# Patient Record
Sex: MALE | Race: WHITE | NOT HISPANIC OR LATINO | Employment: OTHER | URBAN - METROPOLITAN AREA
[De-identification: names, ages, dates, MRNs, and addresses within clinical notes are randomized per-mention and may not be internally consistent; named-entity substitution may affect disease eponyms.]

---

## 2023-06-22 LAB
ALBUMIN (MG/L) IN URINE: 7.9 MG/L
ALBUMIN/CREATININE (UG/MG) IN URINE: 5.2 UG/MG CRT (ref 0–30)
ANION GAP IN SER/PLAS: 10 MMOL/L (ref 10–20)
APPEARANCE, URINE: CLEAR
BILIRUBIN, URINE: NEGATIVE
BLOOD, URINE: ABNORMAL
CALCIUM (MG/DL) IN SER/PLAS: 9.2 MG/DL (ref 8.6–10.3)
CARBON DIOXIDE, TOTAL (MMOL/L) IN SER/PLAS: 29 MMOL/L (ref 21–32)
CHLORIDE (MMOL/L) IN SER/PLAS: 104 MMOL/L (ref 98–107)
CHOLESTEROL (MG/DL) IN SER/PLAS: 125 MG/DL (ref 0–199)
CHOLESTEROL IN HDL (MG/DL) IN SER/PLAS: 48.9 MG/DL
CHOLESTEROL/HDL RATIO: 2.6
COLOR, URINE: YELLOW
CREATININE (MG/DL) IN SER/PLAS: 1.11 MG/DL (ref 0.5–1.3)
CREATININE (MG/DL) IN URINE: 151 MG/DL (ref 20–370)
GFR MALE: 70 ML/MIN/1.73M2
GLUCOSE (MG/DL) IN SER/PLAS: 85 MG/DL (ref 74–99)
GLUCOSE, URINE: NEGATIVE MG/DL
HYALINE CASTS, URINE: ABNORMAL /LPF
KETONES, URINE: NEGATIVE MG/DL
LDL: 63 MG/DL (ref 0–99)
LEUKOCYTE ESTERASE, URINE: NEGATIVE
MUCUS, URINE: ABNORMAL /LPF
NITRITE, URINE: NEGATIVE
PH, URINE: 6 (ref 5–8)
POTASSIUM (MMOL/L) IN SER/PLAS: 4.6 MMOL/L (ref 3.5–5.3)
PROSTATE SPECIFIC AG (NG/ML) IN SER/PLAS: 0.39 NG/ML (ref 0–4)
PROTEIN, URINE: NEGATIVE MG/DL
RBC, URINE: 1 /HPF (ref 0–5)
SODIUM (MMOL/L) IN SER/PLAS: 138 MMOL/L (ref 136–145)
SPECIFIC GRAVITY, URINE: 1.02 (ref 1–1.03)
TRIGLYCERIDE (MG/DL) IN SER/PLAS: 66 MG/DL (ref 0–149)
UREA NITROGEN (MG/DL) IN SER/PLAS: 15 MG/DL (ref 6–23)
UROBILINOGEN, URINE: <2 MG/DL (ref 0–1.9)
VLDL: 13 MG/DL (ref 0–40)
WBC, URINE: <1 /HPF (ref 0–5)

## 2023-06-27 ENCOUNTER — OFFICE VISIT (OUTPATIENT)
Dept: PRIMARY CARE | Facility: CLINIC | Age: 73
End: 2023-06-27
Payer: MEDICARE

## 2023-06-27 VITALS
HEIGHT: 73 IN | WEIGHT: 197 LBS | OXYGEN SATURATION: 95 % | BODY MASS INDEX: 26.11 KG/M2 | DIASTOLIC BLOOD PRESSURE: 60 MMHG | RESPIRATION RATE: 18 BRPM | SYSTOLIC BLOOD PRESSURE: 120 MMHG | HEART RATE: 68 BPM

## 2023-06-27 DIAGNOSIS — I25.84 CORONARY ARTERY CALCIFICATION: ICD-10-CM

## 2023-06-27 DIAGNOSIS — I25.10 CORONARY ARTERY CALCIFICATION: ICD-10-CM

## 2023-06-27 DIAGNOSIS — E78.2 MIXED HYPERLIPIDEMIA: ICD-10-CM

## 2023-06-27 DIAGNOSIS — E55.9 VITAMIN D DEFICIENCY DISEASE: ICD-10-CM

## 2023-06-27 DIAGNOSIS — I48.0 PAROXYSMAL A-FIB (MULTI): ICD-10-CM

## 2023-06-27 DIAGNOSIS — N40.0 ENLARGED PROSTATE WITHOUT LOWER URINARY TRACT SYMPTOMS (LUTS): ICD-10-CM

## 2023-06-27 DIAGNOSIS — Z00.00 ANNUAL PHYSICAL EXAM: ICD-10-CM

## 2023-06-27 DIAGNOSIS — G62.9 NEUROPATHY: Primary | ICD-10-CM

## 2023-06-27 PROBLEM — E78.5 HYPERLIPEMIA: Status: ACTIVE | Noted: 2023-06-27

## 2023-06-27 PROBLEM — K40.90 LEFT INGUINAL HERNIA: Status: ACTIVE | Noted: 2023-06-27

## 2023-06-27 PROBLEM — L60.2 THICKENED NAIL: Status: RESOLVED | Noted: 2023-06-27 | Resolved: 2023-06-27

## 2023-06-27 PROBLEM — R20.0 NUMBNESS: Status: ACTIVE | Noted: 2023-06-27

## 2023-06-27 PROBLEM — L60.2 THICKENED NAIL: Status: ACTIVE | Noted: 2023-06-27

## 2023-06-27 PROBLEM — Q21.12 PFO (PATENT FORAMEN OVALE) (HHS-HCC): Status: ACTIVE | Noted: 2023-06-27

## 2023-06-27 PROBLEM — I77.82: Status: ACTIVE | Noted: 2023-06-27

## 2023-06-27 PROBLEM — K63.5 COLON POLYPS: Status: ACTIVE | Noted: 2023-06-27

## 2023-06-27 PROBLEM — R93.1 AGATSTON CORONARY ARTERY CALCIUM SCORE GREATER THAN 400: Status: ACTIVE | Noted: 2023-06-27

## 2023-06-27 PROBLEM — E88.810 SYNDROME X, METABOLIC: Status: ACTIVE | Noted: 2023-06-27

## 2023-06-27 PROBLEM — I47.20 VENTRICULAR TACHYCARDIA (MULTI): Status: ACTIVE | Noted: 2023-06-27

## 2023-06-27 PROCEDURE — 1036F TOBACCO NON-USER: CPT | Performed by: INTERNAL MEDICINE

## 2023-06-27 PROCEDURE — 99204 OFFICE O/P NEW MOD 45 MIN: CPT | Performed by: INTERNAL MEDICINE

## 2023-06-27 PROCEDURE — 1159F MED LIST DOCD IN RCRD: CPT | Performed by: INTERNAL MEDICINE

## 2023-06-27 RX ORDER — GABAPENTIN 100 MG/1
200 CAPSULE ORAL 3 TIMES DAILY
Qty: 180 CAPSULE | Refills: 5 | Status: SHIPPED | OUTPATIENT
Start: 2023-06-27 | End: 2023-12-19 | Stop reason: ALTCHOICE

## 2023-06-27 RX ORDER — ATORVASTATIN CALCIUM 80 MG/1
1 TABLET, FILM COATED ORAL DAILY
COMMUNITY
Start: 2013-06-22 | End: 2024-01-22 | Stop reason: SDUPTHER

## 2023-06-27 RX ORDER — CRANBERRY FRUIT EXTRACT 650 MG
CAPSULE ORAL 2 TIMES DAILY
COMMUNITY

## 2023-06-27 RX ORDER — WARFARIN SODIUM 2.5 MG/1
TABLET ORAL
COMMUNITY

## 2023-06-27 RX ORDER — EZETIMIBE 10 MG/1
1 TABLET ORAL DAILY
COMMUNITY
Start: 2020-03-01 | End: 2024-01-24

## 2023-06-27 RX ORDER — METOPROLOL SUCCINATE 25 MG/1
TABLET, EXTENDED RELEASE ORAL
COMMUNITY
End: 2023-10-04 | Stop reason: SDUPTHER

## 2023-06-27 RX ORDER — ACETAMINOPHEN 500 MG
TABLET ORAL
COMMUNITY
Start: 2016-04-13

## 2023-06-27 RX ORDER — SILDENAFIL 100 MG/1
100 TABLET, FILM COATED ORAL DAILY PRN
COMMUNITY

## 2023-06-27 RX ORDER — WARFARIN SODIUM 5 MG/1
5 TABLET ORAL
COMMUNITY

## 2023-06-27 ASSESSMENT — PATIENT HEALTH QUESTIONNAIRE - PHQ9
SUM OF ALL RESPONSES TO PHQ9 QUESTIONS 1 & 2: 0
2. FEELING DOWN, DEPRESSED OR HOPELESS: NOT AT ALL
1. LITTLE INTEREST OR PLEASURE IN DOING THINGS: NOT AT ALL

## 2023-06-29 ENCOUNTER — APPOINTMENT (OUTPATIENT)
Dept: PRIMARY CARE | Facility: CLINIC | Age: 73
End: 2023-06-29
Payer: MEDICARE

## 2023-07-14 NOTE — PROGRESS NOTES
"Subjective   Osiel Peterson is a 73 y.o. male who presents for New Patient Visit.  Patient presents to [facility].  He was previously seeing Dr. Blanco.  He has a history of atrial fibrillation.  He follows with Dr. Long.  He is anticoagulated with Coumadin.  His last INR was 3.12 weeks ago.  He follows with the Coumadin clinic.  Previous labs reviewed.  LDL at goal at 63.  Patient has a history of BPH.  He follows with Dr. Weiner.  No acute issues or complaints.        Objective     /60 (BP Location: Right arm, Patient Position: Sitting, BP Cuff Size: Adult)   Pulse 68   Resp 18   Ht 1.854 m (6' 1\")   Wt 89.4 kg (197 lb)   SpO2 95%   BMI 25.99 kg/m²      Physical Exam  Constitutional:       Appearance: Normal appearance.   HENT:      Head: Normocephalic and atraumatic.      Nose: Nose normal.      Mouth/Throat:      Mouth: Mucous membranes are moist.      Pharynx: Oropharynx is clear.   Eyes:      Extraocular Movements: Extraocular movements intact.      Pupils: Pupils are equal, round, and reactive to light.   Cardiovascular:      Rate and Rhythm: Normal rate and regular rhythm.   Pulmonary:      Effort: No respiratory distress.      Breath sounds: Normal breath sounds. No wheezing, rhonchi or rales.   Abdominal:      General: Bowel sounds are normal. There is no distension.      Palpations: Abdomen is soft.      Tenderness: There is no abdominal tenderness. There is no guarding.   Musculoskeletal:      Right lower leg: No edema.      Left lower leg: No edema.   Skin:     General: Skin is warm and dry.   Neurological:      Mental Status: He is alert and oriented to person, place, and time. Mental status is at baseline.   Psychiatric:         Mood and Affect: Mood normal.         Behavior: Behavior normal.         Assessment/Plan   Problem List Items Addressed This Visit       Coronary artery calcification    Relevant Medications    metoprolol succinate XL (Toprol-XL) 25 mg 24 hr tablet    " sildenafil (Viagra) 100 mg tablet    Enlarged prostate without lower urinary tract symptoms (luts)    Hyperlipemia    Paroxysmal A-fib (CMS/HCC)    Relevant Medications    metoprolol succinate XL (Toprol-XL) 25 mg 24 hr tablet    sildenafil (Viagra) 100 mg tablet    Other Relevant Orders    CBC    Vitamin D deficiency disease    Relevant Orders    Vitamin D, Total     Other Visit Diagnoses       Neuropathy    -  Primary    Relevant Medications    gabapentin (Neurontin) 100 mg capsule    Annual physical exam        Relevant Orders    CBC    Comprehensive Metabolic Panel    Lipid Panel    Vitamin D, Total    Thyroid Stimulating Hormone    Hemoglobin A1C    Vitamin B12          Continue medications as previously prescribed  Start gabapentin 300 mg 3 times daily for neuropathy  Maintain follow-ups with specialist.  Return in 6 months for a physical.  We will check a B12 level as well.       Tripp Uriarte, DO

## 2023-10-04 DIAGNOSIS — I48.0 PAROXYSMAL A-FIB (MULTI): ICD-10-CM

## 2023-10-04 DIAGNOSIS — I47.20 VENTRICULAR TACHYCARDIA (MULTI): Primary | ICD-10-CM

## 2023-10-05 RX ORDER — METOPROLOL SUCCINATE 25 MG/1
TABLET, EXTENDED RELEASE ORAL
Qty: 90 TABLET | Refills: 0 | Status: SHIPPED | OUTPATIENT
Start: 2023-10-05 | End: 2024-01-23 | Stop reason: SDUPTHER

## 2023-10-27 ENCOUNTER — ANTICOAGULATION - WARFARIN VISIT (OUTPATIENT)
Dept: PHARMACY | Facility: CLINIC | Age: 73
End: 2023-10-27
Payer: MEDICARE

## 2023-10-27 DIAGNOSIS — I48.0 PAROXYSMAL ATRIAL FIBRILLATION (MULTI): Primary | ICD-10-CM

## 2023-10-27 PROBLEM — Z86.010 HISTORY OF ADENOMATOUS POLYP OF COLON: Status: ACTIVE | Noted: 2018-06-21

## 2023-10-27 PROBLEM — Z86.0101 HISTORY OF ADENOMATOUS POLYP OF COLON: Status: ACTIVE | Noted: 2018-06-21

## 2023-10-27 LAB
POC INR: 2.4
POC PROTHROMBIN TIME: NORMAL

## 2023-10-27 PROCEDURE — 99212 OFFICE O/P EST SF 10 MIN: CPT

## 2023-10-27 PROCEDURE — 85610 PROTHROMBIN TIME: CPT

## 2023-10-27 RX ORDER — FLECAINIDE ACETATE 50 MG/1
1 TABLET ORAL 2 TIMES DAILY
COMMUNITY
Start: 2023-09-29 | End: 2024-01-22

## 2023-10-27 RX ORDER — DESONIDE 0.5 MG/G
CREAM TOPICAL 2 TIMES DAILY
COMMUNITY
Start: 2013-09-13

## 2023-10-27 NOTE — PROGRESS NOTES
No bleeding or unusual bruising - the 2 bruises from last visit faded.  Medications and doses verified.  No scheduled procedures at this time.  INR=2.4   Plan: Continue same dose.  Follow up INR check in 5 weeks.

## 2023-12-01 ENCOUNTER — APPOINTMENT (OUTPATIENT)
Dept: PHARMACY | Facility: CLINIC | Age: 73
End: 2023-12-01
Payer: MEDICARE

## 2023-12-05 ENCOUNTER — ANTICOAGULATION - WARFARIN VISIT (OUTPATIENT)
Dept: PHARMACY | Facility: CLINIC | Age: 73
End: 2023-12-05
Payer: MEDICARE

## 2023-12-05 DIAGNOSIS — I48.0 PAROXYSMAL ATRIAL FIBRILLATION (MULTI): Primary | ICD-10-CM

## 2023-12-05 LAB
POC INR: 2
POC PROTHROMBIN TIME: NORMAL

## 2023-12-05 PROCEDURE — 85610 PROTHROMBIN TIME: CPT | Mod: QW

## 2023-12-05 PROCEDURE — 99212 OFFICE O/P EST SF 10 MIN: CPT

## 2023-12-05 NOTE — PROGRESS NOTES
Coumadin Clinic Visit Note    Patient verified warfarin dose of 5 mg on Sun and Thur, 7.5 mg all other days in week  No missed doses  No unusual bruising or bleeding  No changes to medications  Patient is taking OMEGA XL otc for the past week, will see doctor next week and discuss if should continue   Consistent dietary green intake  No anticipated procedures at this time  INR Therapeutic today at 2  No changes to warfarin dose today  Did  patient on possible interaction between otc omega supplements and warfarin  Next appointment 5 weeks    Luciana Cantu, Pharm D

## 2023-12-12 ENCOUNTER — LAB (OUTPATIENT)
Dept: LAB | Facility: LAB | Age: 73
End: 2023-12-12
Payer: MEDICARE

## 2023-12-12 DIAGNOSIS — I48.0 PAROXYSMAL A-FIB (MULTI): ICD-10-CM

## 2023-12-12 DIAGNOSIS — Z00.00 ANNUAL PHYSICAL EXAM: ICD-10-CM

## 2023-12-12 DIAGNOSIS — E55.9 VITAMIN D DEFICIENCY DISEASE: ICD-10-CM

## 2023-12-12 LAB
ALBUMIN SERPL BCP-MCNC: 4.1 G/DL (ref 3.4–5)
ALP SERPL-CCNC: 62 U/L (ref 33–136)
ALT SERPL W P-5'-P-CCNC: 31 U/L (ref 10–52)
ANION GAP SERPL CALC-SCNC: 11 MMOL/L (ref 10–20)
AST SERPL W P-5'-P-CCNC: 24 U/L (ref 9–39)
BILIRUB SERPL-MCNC: 1.4 MG/DL (ref 0–1.2)
BUN SERPL-MCNC: 19 MG/DL (ref 6–23)
CALCIUM SERPL-MCNC: 9.2 MG/DL (ref 8.6–10.3)
CHLORIDE SERPL-SCNC: 103 MMOL/L (ref 98–107)
CHOLEST SERPL-MCNC: 135 MG/DL (ref 0–199)
CHOLESTEROL/HDL RATIO: 2.5
CO2 SERPL-SCNC: 29 MMOL/L (ref 21–32)
CREAT SERPL-MCNC: 1.2 MG/DL (ref 0.5–1.3)
ERYTHROCYTE [DISTWIDTH] IN BLOOD BY AUTOMATED COUNT: 15.2 % (ref 11.5–14.5)
GFR SERPL CREATININE-BSD FRML MDRD: 63 ML/MIN/1.73M*2
GLUCOSE SERPL-MCNC: 92 MG/DL (ref 74–99)
HCT VFR BLD AUTO: 48.2 % (ref 41–52)
HDLC SERPL-MCNC: 54.3 MG/DL
HGB BLD-MCNC: 15.5 G/DL (ref 13.5–17.5)
LDLC SERPL CALC-MCNC: 62 MG/DL
MCH RBC QN AUTO: 30.6 PG (ref 26–34)
MCHC RBC AUTO-ENTMCNC: 32.2 G/DL (ref 32–36)
MCV RBC AUTO: 95 FL (ref 80–100)
NON HDL CHOLESTEROL: 81 MG/DL (ref 0–149)
NRBC BLD-RTO: 0 /100 WBCS (ref 0–0)
PLATELET # BLD AUTO: 215 X10*3/UL (ref 150–450)
POTASSIUM SERPL-SCNC: 4.4 MMOL/L (ref 3.5–5.3)
PROT SERPL-MCNC: 6.6 G/DL (ref 6.4–8.2)
RBC # BLD AUTO: 5.07 X10*6/UL (ref 4.5–5.9)
SODIUM SERPL-SCNC: 139 MMOL/L (ref 136–145)
TRIGL SERPL-MCNC: 92 MG/DL (ref 0–149)
TSH SERPL-ACNC: 3.28 MIU/L (ref 0.44–3.98)
VLDL: 18 MG/DL (ref 0–40)
WBC # BLD AUTO: 5.9 X10*3/UL (ref 4.4–11.3)

## 2023-12-12 PROCEDURE — 82607 VITAMIN B-12: CPT

## 2023-12-12 PROCEDURE — 36415 COLL VENOUS BLD VENIPUNCTURE: CPT

## 2023-12-12 PROCEDURE — 80061 LIPID PANEL: CPT

## 2023-12-12 PROCEDURE — 80053 COMPREHEN METABOLIC PANEL: CPT

## 2023-12-12 PROCEDURE — 84443 ASSAY THYROID STIM HORMONE: CPT

## 2023-12-12 PROCEDURE — 82306 VITAMIN D 25 HYDROXY: CPT

## 2023-12-12 PROCEDURE — 83036 HEMOGLOBIN GLYCOSYLATED A1C: CPT

## 2023-12-12 PROCEDURE — 85027 COMPLETE CBC AUTOMATED: CPT

## 2023-12-13 LAB
25(OH)D3 SERPL-MCNC: 54 NG/ML (ref 30–100)
EST. AVERAGE GLUCOSE BLD GHB EST-MCNC: 131 MG/DL
HBA1C MFR BLD: 6.2 %
VIT B12 SERPL-MCNC: 314 PG/ML (ref 211–911)

## 2023-12-19 ENCOUNTER — OFFICE VISIT (OUTPATIENT)
Dept: PRIMARY CARE | Facility: CLINIC | Age: 73
End: 2023-12-19
Payer: MEDICARE

## 2023-12-19 VITALS
HEIGHT: 73 IN | OXYGEN SATURATION: 98 % | DIASTOLIC BLOOD PRESSURE: 64 MMHG | HEART RATE: 74 BPM | RESPIRATION RATE: 16 BRPM | BODY MASS INDEX: 26.24 KG/M2 | WEIGHT: 198 LBS | SYSTOLIC BLOOD PRESSURE: 112 MMHG

## 2023-12-19 DIAGNOSIS — K63.5 POLYP OF COLON, UNSPECIFIED PART OF COLON, UNSPECIFIED TYPE: ICD-10-CM

## 2023-12-19 DIAGNOSIS — E78.2 MIXED HYPERLIPIDEMIA: ICD-10-CM

## 2023-12-19 DIAGNOSIS — R17 TOTAL BILIRUBIN, ELEVATED: Primary | ICD-10-CM

## 2023-12-19 DIAGNOSIS — I25.10 CORONARY ARTERY CALCIFICATION: ICD-10-CM

## 2023-12-19 DIAGNOSIS — I48.0 PAROXYSMAL A-FIB (MULTI): ICD-10-CM

## 2023-12-19 DIAGNOSIS — I25.84 CORONARY ARTERY CALCIFICATION: ICD-10-CM

## 2023-12-19 DIAGNOSIS — N40.0 ENLARGED PROSTATE WITHOUT LOWER URINARY TRACT SYMPTOMS (LUTS): ICD-10-CM

## 2023-12-19 PROCEDURE — 1036F TOBACCO NON-USER: CPT | Performed by: INTERNAL MEDICINE

## 2023-12-19 PROCEDURE — 99214 OFFICE O/P EST MOD 30 MIN: CPT | Performed by: INTERNAL MEDICINE

## 2023-12-19 PROCEDURE — 1126F AMNT PAIN NOTED NONE PRSNT: CPT | Performed by: INTERNAL MEDICINE

## 2023-12-19 PROCEDURE — 1159F MED LIST DOCD IN RCRD: CPT | Performed by: INTERNAL MEDICINE

## 2023-12-19 PROCEDURE — G0439 PPPS, SUBSEQ VISIT: HCPCS | Performed by: INTERNAL MEDICINE

## 2023-12-19 ASSESSMENT — PATIENT HEALTH QUESTIONNAIRE - PHQ9
1. LITTLE INTEREST OR PLEASURE IN DOING THINGS: NOT AT ALL
2. FEELING DOWN, DEPRESSED OR HOPELESS: NOT AT ALL
SUM OF ALL RESPONSES TO PHQ9 QUESTIONS 1 & 2: 0

## 2023-12-19 ASSESSMENT — ENCOUNTER SYMPTOMS
LOSS OF SENSATION IN FEET: 0
DEPRESSION: 0
OCCASIONAL FEELINGS OF UNSTEADINESS: 0

## 2023-12-19 ASSESSMENT — PAIN SCALES - GENERAL: PAINLEVEL: 0-NO PAIN

## 2023-12-27 PROBLEM — I48.0 PAROXYSMAL ATRIAL FIBRILLATION (MULTI): Status: RESOLVED | Noted: 2023-10-27 | Resolved: 2023-12-27

## 2023-12-27 PROBLEM — R20.0 NUMBNESS: Status: RESOLVED | Noted: 2023-06-27 | Resolved: 2023-12-27

## 2023-12-28 NOTE — PROGRESS NOTES
"Patient presents for his yearly physical.  Subjective   Reason for Visit: Osiel Peterson is an 73 y.o. male here for a Medicare Wellness visit.               Recent INR 2.0.  Blood work reviewed with patient.  It is nonfasting.  T. bili elevated at 1.4.  Recently started on flecainide.  Patient is exercising 3-4 times a week.  Patient is due for colonoscopy.  He is a non-smoker.  No issues with bowel or bladder.  No issues with sleep.  He denies depression.  No recent falls.  He feels safe at home.        Patient Care Team:  Tripp Uriarte DO as PCP - General (Internal Medicine)     Review of Systems   All other systems reviewed and are negative.      Objective   Vitals:  /64 (BP Location: Right arm, Patient Position: Sitting, BP Cuff Size: Adult)   Pulse 74   Resp 16   Ht 1.854 m (6' 1\")   Wt 89.8 kg (198 lb)   SpO2 98%   BMI 26.12 kg/m²       Physical Exam  Constitutional:       Appearance: Normal appearance.   HENT:      Head: Normocephalic and atraumatic.      Nose: Nose normal.      Mouth/Throat:      Mouth: Mucous membranes are moist.      Pharynx: Oropharynx is clear.   Eyes:      Extraocular Movements: Extraocular movements intact.      Pupils: Pupils are equal, round, and reactive to light.   Cardiovascular:      Rate and Rhythm: Normal rate and regular rhythm.   Pulmonary:      Effort: No respiratory distress.      Breath sounds: Normal breath sounds. No wheezing, rhonchi or rales.   Abdominal:      General: Bowel sounds are normal. There is no distension.      Palpations: Abdomen is soft.      Tenderness: There is no abdominal tenderness. There is no guarding.   Musculoskeletal:      Right lower leg: No edema.      Left lower leg: No edema.   Skin:     General: Skin is warm and dry.   Neurological:      Mental Status: He is alert and oriented to person, place, and time. Mental status is at baseline.   Psychiatric:         Mood and Affect: Mood normal.         Behavior: Behavior normal. "         Assessment/Plan   Problem List Items Addressed This Visit       Coronary artery calcification    Current Assessment & Plan     Continue current medications         Colon polyps    Current Assessment & Plan     Due for repeat colonoscopy         Enlarged prostate without lower urinary tract symptoms (luts)    Current Assessment & Plan     Continue current medications         Hyperlipemia    Current Assessment & Plan     Continue statin         Paroxysmal A-fib (CMS/HCC)    Current Assessment & Plan     Continue current medications          Other Visit Diagnoses       Total bilirubin, elevated    -  Primary    Relevant Orders    Comprehensive Metabolic Panel          Return in 6 months for repeat CMP to monitor bilirubin

## 2024-01-09 ENCOUNTER — ANTICOAGULATION - WARFARIN VISIT (OUTPATIENT)
Dept: PHARMACY | Facility: CLINIC | Age: 74
End: 2024-01-09
Payer: MEDICARE

## 2024-01-09 ENCOUNTER — CLINICAL SUPPORT (OUTPATIENT)
Dept: PHARMACY | Facility: CLINIC | Age: 74
End: 2024-01-09
Payer: MEDICARE

## 2024-01-09 DIAGNOSIS — I48.0 PAROXYSMAL ATRIAL FIBRILLATION (MULTI): Primary | ICD-10-CM

## 2024-01-09 LAB
POC INR: 3
POC PROTHROMBIN TIME: NORMAL

## 2024-01-09 PROCEDURE — 85610 PROTHROMBIN TIME: CPT | Mod: QW

## 2024-01-09 PROCEDURE — 99212 OFFICE O/P EST SF 10 MIN: CPT

## 2024-01-09 NOTE — PROGRESS NOTES
No bleeding or unusual bruising.  Medications and doses verified.  No scheduled procedures at this time.  INR=3.0   Plan: Continue same weekly dose.  Follow up INR check in 5 weeks.

## 2024-01-18 DIAGNOSIS — E78.2 MIXED HYPERLIPIDEMIA: ICD-10-CM

## 2024-01-22 DIAGNOSIS — I48.0 PAROXYSMAL A-FIB (MULTI): ICD-10-CM

## 2024-01-22 PROBLEM — R00.2 PALPITATIONS: Status: ACTIVE | Noted: 2024-01-22

## 2024-01-22 PROBLEM — K59.00 CONSTIPATION: Status: ACTIVE | Noted: 2024-01-22

## 2024-01-22 PROBLEM — D72.829 LEUKOCYTOSIS: Status: ACTIVE | Noted: 2024-01-22

## 2024-01-22 PROBLEM — T50.905A PILL ESOPHAGITIS: Status: ACTIVE | Noted: 2024-01-22

## 2024-01-22 PROBLEM — R10.9 RIGHT SIDED ABDOMINAL PAIN: Status: ACTIVE | Noted: 2024-01-22

## 2024-01-22 PROBLEM — N20.0 CALCULUS OF KIDNEY: Status: ACTIVE | Noted: 2024-01-22

## 2024-01-22 PROBLEM — K20.80 PILL ESOPHAGITIS: Status: ACTIVE | Noted: 2024-01-22

## 2024-01-22 PROBLEM — R17 HIGH TOTAL BILIRUBIN: Status: ACTIVE | Noted: 2024-01-22

## 2024-01-22 PROBLEM — I95.9 HYPOTENSION: Status: ACTIVE | Noted: 2024-01-22

## 2024-01-22 RX ORDER — FLECAINIDE ACETATE 50 MG/1
50 TABLET ORAL 2 TIMES DAILY
Qty: 180 TABLET | Refills: 2 | Status: SHIPPED | OUTPATIENT
Start: 2024-01-22 | End: 2024-01-29 | Stop reason: SDUPTHER

## 2024-01-22 RX ORDER — ATORVASTATIN CALCIUM 80 MG/1
80 TABLET, FILM COATED ORAL DAILY
Qty: 90 TABLET | Refills: 3 | Status: SHIPPED | OUTPATIENT
Start: 2024-01-22

## 2024-01-23 DIAGNOSIS — E78.2 MIXED HYPERLIPIDEMIA: ICD-10-CM

## 2024-01-23 DIAGNOSIS — I48.0 PAROXYSMAL A-FIB (MULTI): ICD-10-CM

## 2024-01-23 DIAGNOSIS — I47.20 VENTRICULAR TACHYCARDIA (MULTI): ICD-10-CM

## 2024-01-23 RX ORDER — METOPROLOL SUCCINATE 25 MG/1
TABLET, EXTENDED RELEASE ORAL
Qty: 90 TABLET | Refills: 3 | Status: SHIPPED | OUTPATIENT
Start: 2024-01-23

## 2024-01-24 RX ORDER — EZETIMIBE 10 MG/1
10 TABLET ORAL DAILY
Qty: 90 TABLET | Refills: 3 | Status: SHIPPED | OUTPATIENT
Start: 2024-01-24

## 2024-01-29 ENCOUNTER — OFFICE VISIT (OUTPATIENT)
Dept: CARDIOLOGY | Facility: CLINIC | Age: 74
End: 2024-01-29
Payer: MEDICARE

## 2024-01-29 VITALS
WEIGHT: 203 LBS | HEART RATE: 60 BPM | DIASTOLIC BLOOD PRESSURE: 64 MMHG | SYSTOLIC BLOOD PRESSURE: 120 MMHG | OXYGEN SATURATION: 96 % | HEIGHT: 73 IN | BODY MASS INDEX: 26.9 KG/M2

## 2024-01-29 DIAGNOSIS — I48.0 PAROXYSMAL A-FIB (MULTI): Primary | ICD-10-CM

## 2024-01-29 PROCEDURE — 1036F TOBACCO NON-USER: CPT | Performed by: INTERNAL MEDICINE

## 2024-01-29 PROCEDURE — 1159F MED LIST DOCD IN RCRD: CPT | Performed by: INTERNAL MEDICINE

## 2024-01-29 PROCEDURE — 99214 OFFICE O/P EST MOD 30 MIN: CPT | Performed by: INTERNAL MEDICINE

## 2024-01-29 PROCEDURE — 1126F AMNT PAIN NOTED NONE PRSNT: CPT | Performed by: INTERNAL MEDICINE

## 2024-01-29 RX ORDER — FLECAINIDE ACETATE 50 MG/1
50 TABLET ORAL 2 TIMES DAILY
Qty: 180 TABLET | Refills: 3 | Status: SHIPPED | OUTPATIENT
Start: 2024-01-29 | End: 2025-01-28

## 2024-01-29 NOTE — PATIENT INSTRUCTIONS
If you have another episode of atrial fibrillation, then change the flecainide to 2 tabs every morning and 1 tab in the evening.    The maximum dose of Flecainide is 150 mg twice daily.    Follow up with Dr Ramicone in 8-10 months.

## 2024-01-29 NOTE — PROGRESS NOTES
"History Of Present Illness:      This is a 73-year-old male with a history of atrial fibrillation.  He presents for a follow-up evaluation.  The patient has been maintaining sinus rhythm for the most part, but had 2 episodes of atrial fibrillation in the past several months.  1 episode occurred in October and another occurred in December.  Both episodes lasted for approximately 12 hours.    Past medical history:    Paroxysmal atrial fibrillation: Originally diagnosed in the setting of sepsis March 2019  Elevated coronary artery calcium score  Hyperlipidemia  PFO    Family history: Sister has atrial fibrillation and has had 2 ablation procedures    Review of Systems  Other review of systems negative     Last Recorded Vitals:      8/22/2023     3:03 PM 8/22/2023     3:18 PM 9/29/2023     9:57 AM 12/19/2023     2:06 PM 1/29/2024     9:25 AM 6/4/2024     1:16 PM 10/23/2024     2:07 PM   Vitals   Systolic 110 118 116 112 120 98 126   Diastolic 62 65 68 64 64 62 72   Heart Rate 64  62 74 60 58 68   Resp    16  18    Height (in)   1.854 m (6' 1\") 1.854 m (6' 1\") 1.854 m (6' 1\") 1.854 m (6' 1\") 1.854 m (6' 1\")   Weight (lb) 197  197 198 203 198 205   BMI 25.99 kg/m2  25.99 kg/m2 26.12 kg/m2 26.78 kg/m2 26.12 kg/m2 27.05 kg/m2   BSA (m2) 2.15 m2  2.15 m2 2.15 m2 2.18 m2 2.15 m2 2.19 m2   Visit Report    Report Report Report Report          Allergies:  Patient has no known allergies.    Outpatient Medications:  Current Outpatient Medications   Medication Instructions    atorvastatin (LIPITOR) 80 mg, oral, Daily    cholecalciferol (Vitamin D-3) 5,000 Units tablet Take by mouth.    desonide (DesOwen) 0.05 % cream 2 times daily    ezetimibe (ZETIA) 10 mg, oral, Daily    flecainide (TAMBOCOR) 50 mg, oral, 2 times daily    metoprolol succinate XL (Toprol-XL) 25 mg 24 hr tablet TAKE ONE AND ONE-HALF TABLETS DAILY    sildenafil (VIAGRA) 100 mg, Daily PRN    vit B6-mag cit,oxid-potass cit (Theralith XR) 3.75-45-45-49.5 mg tablet " extended release 2 times daily    VITAMIN B COMPLEX ORAL Refill(s) 0, Date: 06/21/2018 07:32:00 EDT    warfarin (Coumadin) 2.5 mg tablet Take 2.5 mg (1 tablet) every Monday, Tuesday, Wednesday, Friday, Saturday (with Warfarin 5 mg) or as directed by the anticoagulation clinic.    warfarin (Coumadin) 5 mg tablet Take 5 mg (1 tablet) daily or as directed by the anticoagulation clinic.       Physical Exam:    General Appearance:  Alert, oriented, no distress  Skin:  Warm and dry  Head and Neck:  No elevation of JVP, no carotid bruits  Cardiac Exam:  Rhythm is regular, S1 and S2 are normal, no murmur S3 or S4  Lungs:  Clear to auscultation  Extremities:  no edema  Neurologic:  No focal deficits  Psychiatric:  Appropriate mood and behavior         Cardiology Tests:  I have personally review the diagnostic cardiac testing and my interpretation is as follows:        Myocardial perfusion stress test 2021: No ischemia and normal left ventricular function  Echocardiogram 2021: Normal left ventricular function with mild to moderate mitral valve regurgitation and a PFO      Assessment/Plan     1. Paroxysmal atrial fibrillation: Osiel is in sinus rhythm today but he has had some episodes of atrial fibrillation in the past few months. Continue flecainide at the same dosage for now. We will titrate the dose upward as needed for recurrences. I also discussed the option of pulmonary vein isolation which can be considered at any time if necessary. The procedure, risks, and alternatives were discussed. Follow-up in 8 to 10 months.   James C Ramicone, DO

## 2024-01-30 NOTE — ASSESSMENT & PLAN NOTE
1.  Paroxysmal atrial fibrillation: Osiel is in sinus rhythm today but he has had some episodes of atrial fibrillation in the past few months.  Continue flecainide at the same dosage for now.  We will titrate the dose upward as needed for recurrences.  I also discussed the option of pulmonary vein isolation which can be considered at any time if necessary.  The procedure, risks, and alternatives were discussed.  Follow-up in 8 to 10 months.

## 2024-02-13 ENCOUNTER — CLINICAL SUPPORT (OUTPATIENT)
Dept: PHARMACY | Facility: CLINIC | Age: 74
End: 2024-02-13
Payer: MEDICARE

## 2024-02-13 ENCOUNTER — ANTICOAGULATION - WARFARIN VISIT (OUTPATIENT)
Dept: PHARMACY | Facility: CLINIC | Age: 74
End: 2024-02-13
Payer: MEDICARE

## 2024-02-13 DIAGNOSIS — I48.0 PAROXYSMAL ATRIAL FIBRILLATION (MULTI): Primary | ICD-10-CM

## 2024-02-13 LAB
POC INR: 2.4
POC PROTHROMBIN TIME: NORMAL

## 2024-02-13 PROCEDURE — 99212 OFFICE O/P EST SF 10 MIN: CPT | Performed by: PHARMACIST

## 2024-02-13 PROCEDURE — 85610 PROTHROMBIN TIME: CPT | Mod: QW | Performed by: PHARMACIST

## 2024-02-13 NOTE — PROGRESS NOTES
Verified current dose with pt.    No new meds or med changes since last visit.  Pt denies unusual bleed/bruise.  No upcoming procedures.  Inr =   Continue same dose and check again in 5 weeks.

## 2024-02-29 DIAGNOSIS — I48.0 PAROXYSMAL A-FIB (MULTI): Primary | ICD-10-CM

## 2024-03-19 ENCOUNTER — ANTICOAGULATION - WARFARIN VISIT (OUTPATIENT)
Dept: PHARMACY | Facility: CLINIC | Age: 74
End: 2024-03-19
Payer: MEDICARE

## 2024-03-19 DIAGNOSIS — I48.0 PAROXYSMAL A-FIB (MULTI): Primary | ICD-10-CM

## 2024-03-19 LAB
POC INR: 3.5
POC PROTHROMBIN TIME: NORMAL

## 2024-03-19 PROCEDURE — 85610 PROTHROMBIN TIME: CPT | Mod: QW

## 2024-03-19 PROCEDURE — 99212 OFFICE O/P EST SF 10 MIN: CPT

## 2024-03-19 NOTE — PROGRESS NOTES
No bleeding or unusual bruising.  Medications and doses verified.  No scheduled procedures at this time.  INR=3.5   No OTCs. No v/d.  Pt may have eaten a little less greens than usual lately.  Plan: Half dose today then continue same weekly dose.  Follow up INR check in 5 weeks.

## 2024-04-23 ENCOUNTER — ANTICOAGULATION - WARFARIN VISIT (OUTPATIENT)
Dept: PHARMACY | Facility: CLINIC | Age: 74
End: 2024-04-23
Payer: MEDICARE

## 2024-04-23 DIAGNOSIS — I48.0 PAROXYSMAL A-FIB (MULTI): Primary | ICD-10-CM

## 2024-04-23 LAB
POC INR: 2.6
POC PROTHROMBIN TIME: NORMAL

## 2024-04-23 PROCEDURE — 85610 PROTHROMBIN TIME: CPT | Mod: QW

## 2024-04-23 PROCEDURE — 99212 OFFICE O/P EST SF 10 MIN: CPT

## 2024-04-23 NOTE — PROGRESS NOTES
Coumadin Clinic Visit Note    Patient verified warfarin dose  No missed doses  Patient states he has some bruises on his feet  Patient also states is in active Afib episode the last few days and isn't feeling the best  No changes to medications  Patient reports has been eating salads the last few days  No anticipated procedures at this time  INR Therapeutic today at 2.6  No changes to warfarin dose today  Next appointment 5 weeks    Luciana Cantu, Pharm D

## 2024-05-28 ENCOUNTER — ANTICOAGULATION - WARFARIN VISIT (OUTPATIENT)
Dept: PHARMACY | Facility: CLINIC | Age: 74
End: 2024-05-28
Payer: MEDICARE

## 2024-05-28 DIAGNOSIS — I48.0 PAROXYSMAL A-FIB (MULTI): Primary | ICD-10-CM

## 2024-05-28 LAB
POC INR: 2.4
POC PROTHROMBIN TIME: NORMAL

## 2024-05-28 PROCEDURE — 85610 PROTHROMBIN TIME: CPT | Mod: QW | Performed by: PHARMACIST

## 2024-05-28 PROCEDURE — 99212 OFFICE O/P EST SF 10 MIN: CPT | Performed by: PHARMACIST

## 2024-05-28 NOTE — PROGRESS NOTES
Osiel Peterson is a 73 y.o. male with history of atrial fibrillation who presents today for anticoagulation monitoring and adjustment.  INR 2.4 is therapeutic for this patient (goal range 2-3) and is reflective of 47.5 mg TWD  Patient verifies current dosing regimen, patient able to verbally recall dose  Patient reports 0 missed doses since last INR  Last INR 2.6 on 4/23/24 (5 week interval)  Patient denies s/sx clotting and/or stroke  Patient denies hematuria, epistaxis, rectal bleeding  Patient denies changes in diet, alcohol, or tobacco use  Vegetable intake consistent from week to week  Reviewed medication list and drug allergies with patient, updated any medication additions or modifications accordingly  Acetaminophen intake: no changes   Patient also denies any pending medical or dental procedures scheduled at this time  Patient was instructed to continue with current therapy of warfarin 47.5mg TWD and RTC 5 weeks    Yasemin Cabrera, FarrukhD, BCPS   5/28/2024 9:23 AM

## 2024-06-04 ENCOUNTER — OFFICE VISIT (OUTPATIENT)
Dept: PRIMARY CARE | Facility: CLINIC | Age: 74
End: 2024-06-04
Payer: MEDICARE

## 2024-06-04 ENCOUNTER — LAB (OUTPATIENT)
Dept: LAB | Facility: LAB | Age: 74
End: 2024-06-04
Payer: MEDICARE

## 2024-06-04 VITALS
WEIGHT: 198 LBS | DIASTOLIC BLOOD PRESSURE: 62 MMHG | RESPIRATION RATE: 18 BRPM | HEIGHT: 73 IN | HEART RATE: 58 BPM | SYSTOLIC BLOOD PRESSURE: 98 MMHG | OXYGEN SATURATION: 96 % | BODY MASS INDEX: 26.24 KG/M2

## 2024-06-04 DIAGNOSIS — R17 TOTAL BILIRUBIN, ELEVATED: ICD-10-CM

## 2024-06-04 DIAGNOSIS — E78.2 MIXED HYPERLIPIDEMIA: ICD-10-CM

## 2024-06-04 DIAGNOSIS — I25.84 CALCIFICATION OF CORONARY ARTERY: Primary | ICD-10-CM

## 2024-06-04 DIAGNOSIS — I25.10 CALCIFICATION OF CORONARY ARTERY: Primary | ICD-10-CM

## 2024-06-04 DIAGNOSIS — I48.0 PAROXYSMAL A-FIB (MULTI): ICD-10-CM

## 2024-06-04 DIAGNOSIS — R79.9 ABNORMAL FINDING OF BLOOD CHEMISTRY, UNSPECIFIED: ICD-10-CM

## 2024-06-04 DIAGNOSIS — Z00.00 ENCOUNTER FOR ANNUAL WELLNESS VISIT (AWV) IN MEDICARE PATIENT: ICD-10-CM

## 2024-06-04 DIAGNOSIS — E55.9 VITAMIN D DEFICIENCY, UNSPECIFIED: ICD-10-CM

## 2024-06-04 LAB
ALBUMIN SERPL BCP-MCNC: 4 G/DL (ref 3.4–5)
ALP SERPL-CCNC: 66 U/L (ref 33–136)
ALT SERPL W P-5'-P-CCNC: 27 U/L (ref 10–52)
ANION GAP SERPL CALC-SCNC: 8 MMOL/L (ref 10–20)
AST SERPL W P-5'-P-CCNC: 26 U/L (ref 9–39)
BILIRUB SERPL-MCNC: 1.2 MG/DL (ref 0–1.2)
BUN SERPL-MCNC: 13 MG/DL (ref 6–23)
CALCIUM SERPL-MCNC: 8.7 MG/DL (ref 8.6–10.3)
CHLORIDE SERPL-SCNC: 106 MMOL/L (ref 98–107)
CO2 SERPL-SCNC: 30 MMOL/L (ref 21–32)
CREAT SERPL-MCNC: 1.25 MG/DL (ref 0.5–1.3)
EGFRCR SERPLBLD CKD-EPI 2021: 60 ML/MIN/1.73M*2
GLUCOSE SERPL-MCNC: 89 MG/DL (ref 74–99)
POTASSIUM SERPL-SCNC: 4.4 MMOL/L (ref 3.5–5.3)
PROT SERPL-MCNC: 6.3 G/DL (ref 6.4–8.2)
SODIUM SERPL-SCNC: 140 MMOL/L (ref 136–145)

## 2024-06-04 PROCEDURE — 36415 COLL VENOUS BLD VENIPUNCTURE: CPT

## 2024-06-04 PROCEDURE — 80053 COMPREHEN METABOLIC PANEL: CPT

## 2024-06-04 ASSESSMENT — PAIN SCALES - GENERAL: PAINLEVEL: 0-NO PAIN

## 2024-06-23 NOTE — PROGRESS NOTES
"Subjective   Osiel Peterson is a 73 y.o. male who presents for Follow-up.  Patient presents for follow-up visit.  He has no acute issues or complaints.  Review of systems is negative.  He is feeling his normal self.  Labs reviewed, bilirubin back to normal.        Objective     BP 98/62 (BP Location: Left arm, Patient Position: Sitting, BP Cuff Size: Adult)   Pulse 58   Resp 18   Ht 1.854 m (6' 1\")   Wt 89.8 kg (198 lb)   SpO2 96%   BMI 26.12 kg/m²      Physical Exam  Constitutional:       Appearance: Normal appearance.   HENT:      Head: Normocephalic and atraumatic.      Nose: Nose normal.      Mouth/Throat:      Mouth: Mucous membranes are moist.      Pharynx: Oropharynx is clear.   Eyes:      Extraocular Movements: Extraocular movements intact.      Pupils: Pupils are equal, round, and reactive to light.   Cardiovascular:      Rate and Rhythm: Normal rate and regular rhythm.   Pulmonary:      Effort: No respiratory distress.      Breath sounds: Normal breath sounds. No wheezing, rhonchi or rales.   Abdominal:      General: Bowel sounds are normal. There is no distension.      Palpations: Abdomen is soft.      Tenderness: There is no abdominal tenderness. There is no guarding.   Musculoskeletal:      Right lower leg: No edema.      Left lower leg: No edema.   Skin:     General: Skin is warm and dry.   Neurological:      Mental Status: He is alert and oriented to person, place, and time. Mental status is at baseline.   Psychiatric:         Mood and Affect: Mood normal.         Behavior: Behavior normal.         Assessment/Plan   Problem List Items Addressed This Visit       Calcification of coronary artery - Primary     Continue current medications         Hyperlipidemia     Continue current medications         Paroxysmal A-fib (Multi)     Continue current medications          Other Visit Diagnoses       Encounter for annual wellness visit (AWV) in Medicare patient        Relevant Orders    Comprehensive " Metabolic Panel    CBC    Hemoglobin A1C    Lipid Panel    Prostate Specific Antigen    Thyroid Stimulating Hormone    Vitamin D 25-Hydroxy,Total (for eval of Vitamin D levels)    Abnormal finding of blood chemistry, unspecified        Relevant Orders    CBC    Vitamin D deficiency, unspecified        Relevant Orders    Vitamin D 25-Hydroxy,Total (for eval of Vitamin D levels)          Return in 6 months for physical       Tripp Uriarte DO

## 2024-07-10 ENCOUNTER — ANTICOAGULATION - WARFARIN VISIT (OUTPATIENT)
Dept: PHARMACY | Facility: CLINIC | Age: 74
End: 2024-07-10
Payer: MEDICARE

## 2024-07-10 DIAGNOSIS — I48.0 PAROXYSMAL A-FIB (MULTI): Primary | ICD-10-CM

## 2024-07-10 LAB
POC INR: 2.1
POC PROTHROMBIN TIME: NORMAL

## 2024-07-10 PROCEDURE — 85610 PROTHROMBIN TIME: CPT | Mod: QW

## 2024-07-10 PROCEDURE — 99212 OFFICE O/P EST SF 10 MIN: CPT

## 2024-07-10 NOTE — PROGRESS NOTES
No bleeding or unusual bruising.  Medications and doses verified.  No scheduled procedures at this time.  INR=2.1   Plan: Continue same weekly dose.  Follow up INR check in 5 weeks.

## 2024-08-14 ENCOUNTER — ANTICOAGULATION - WARFARIN VISIT (OUTPATIENT)
Dept: PHARMACY | Facility: CLINIC | Age: 74
End: 2024-08-14
Payer: MEDICARE

## 2024-08-14 DIAGNOSIS — I48.0 PAROXYSMAL A-FIB (MULTI): Primary | ICD-10-CM

## 2024-08-14 LAB
POC INR: 2.8
POC PROTHROMBIN TIME: NORMAL

## 2024-08-14 PROCEDURE — 85610 PROTHROMBIN TIME: CPT | Mod: QW | Performed by: PHARMACIST

## 2024-08-14 PROCEDURE — 99212 OFFICE O/P EST SF 10 MIN: CPT | Performed by: PHARMACIST

## 2024-08-14 NOTE — PROGRESS NOTES
Coumadin Clinic Visit Note    Patient verified warfarin dose  No missed doses  No unusual bruising or bleeding  No changes to medications  Consistent dietary green intake   No anticipated procedures at this time  INR Therapeutic today at 2.8    Plan:   No changes to warfarin dose today  Next appointment 5 weeks      Fany Bianchi, PharmD

## 2024-08-19 DIAGNOSIS — I48.0 PAROXYSMAL A-FIB (MULTI): Primary | ICD-10-CM

## 2024-08-19 RX ORDER — WARFARIN 2.5 MG/1
TABLET ORAL
Qty: 65 TABLET | Refills: 1 | Status: SHIPPED | OUTPATIENT
Start: 2024-08-19 | End: 2025-08-19

## 2024-08-19 RX ORDER — WARFARIN SODIUM 5 MG/1
TABLET ORAL
Qty: 90 TABLET | Refills: 1 | Status: SHIPPED | OUTPATIENT
Start: 2024-08-19 | End: 2025-08-19

## 2024-09-18 ENCOUNTER — ANTICOAGULATION - WARFARIN VISIT (OUTPATIENT)
Dept: PHARMACY | Facility: CLINIC | Age: 74
End: 2024-09-18
Payer: MEDICARE

## 2024-09-18 DIAGNOSIS — I48.0 PAROXYSMAL A-FIB (MULTI): Primary | ICD-10-CM

## 2024-09-18 LAB
POC INR: 2.4
POC PROTHROMBIN TIME: NORMAL

## 2024-09-18 PROCEDURE — 85610 PROTHROMBIN TIME: CPT | Mod: QW

## 2024-09-18 PROCEDURE — 99212 OFFICE O/P EST SF 10 MIN: CPT

## 2024-09-18 NOTE — PROGRESS NOTES
Osiel Peterson is a 74 y.o. male with history of atrial fibrillation who presents today for anticoagulation monitoring and adjustment. Last INR 2.8 on 8/14/24 (5 week follow up)    Current dose: 5 mg every Sun, Thu; 7.5 mg all other days   INR goal: 2-3     Today's INR: 2.4  - therapeutic for this patient     Missed doses since last INR visit: No   Signs or symptoms of clotting and/or stroke: No  Any hematuria, epistaxis, rectal bleeding: No   Changes in diet, alcohol, or tobacco use: No   Reviewed medication list and drug allergies with patient, updated any medication additions or modifications accordingly  Acetaminophen intake: no changes  Any medical or dental procedures scheduled at this time:  No     Patient asked about Xarelto cost. He used to be on it and was wondering if the cost of it has went down. I discussed with him that it is still a fairly expensive medication, and to have a conversation with his cardiologist if he is really interested in switching. Informed him that  does have a patient assistance program that may be helpful for the cost of the medication.    Plan: Patient was instructed to take same weekly dose   Follow-up: 5 weeks    China Gaytan, PharmD  9/18/2024 9:32 AM

## 2024-10-16 ENCOUNTER — LAB (OUTPATIENT)
Dept: LAB | Facility: LAB | Age: 74
End: 2024-10-16

## 2024-10-16 DIAGNOSIS — Z00.00 ENCOUNTER FOR ANNUAL WELLNESS VISIT (AWV) IN MEDICARE PATIENT: ICD-10-CM

## 2024-10-16 DIAGNOSIS — E55.9 VITAMIN D DEFICIENCY, UNSPECIFIED: ICD-10-CM

## 2024-10-16 DIAGNOSIS — R79.9 ABNORMAL FINDING OF BLOOD CHEMISTRY, UNSPECIFIED: ICD-10-CM

## 2024-10-16 LAB
25(OH)D3 SERPL-MCNC: 56 NG/ML (ref 30–100)
ALBUMIN SERPL BCP-MCNC: 4 G/DL (ref 3.4–5)
ALP SERPL-CCNC: 65 U/L (ref 33–136)
ALT SERPL W P-5'-P-CCNC: 28 U/L (ref 10–52)
ANION GAP SERPL CALC-SCNC: 10 MMOL/L (ref 10–20)
AST SERPL W P-5'-P-CCNC: 25 U/L (ref 9–39)
BILIRUB SERPL-MCNC: 0.9 MG/DL (ref 0–1.2)
BUN SERPL-MCNC: 17 MG/DL (ref 6–23)
CALCIUM SERPL-MCNC: 8.9 MG/DL (ref 8.6–10.3)
CHLORIDE SERPL-SCNC: 104 MMOL/L (ref 98–107)
CHOLEST SERPL-MCNC: 127 MG/DL (ref 0–199)
CHOLESTEROL/HDL RATIO: 2.6
CO2 SERPL-SCNC: 30 MMOL/L (ref 21–32)
CREAT SERPL-MCNC: 1.12 MG/DL (ref 0.5–1.3)
EGFRCR SERPLBLD CKD-EPI 2021: 69 ML/MIN/1.73M*2
ERYTHROCYTE [DISTWIDTH] IN BLOOD BY AUTOMATED COUNT: 15.2 % (ref 11.5–14.5)
GLUCOSE SERPL-MCNC: 72 MG/DL (ref 74–99)
HCT VFR BLD AUTO: 44.6 % (ref 41–52)
HDLC SERPL-MCNC: 49.2 MG/DL
HGB BLD-MCNC: 14.5 G/DL (ref 13.5–17.5)
LDLC SERPL CALC-MCNC: 62 MG/DL
MCH RBC QN AUTO: 30.5 PG (ref 26–34)
MCHC RBC AUTO-ENTMCNC: 32.5 G/DL (ref 32–36)
MCV RBC AUTO: 94 FL (ref 80–100)
NON HDL CHOLESTEROL: 78 MG/DL (ref 0–149)
NRBC BLD-RTO: 0 /100 WBCS (ref 0–0)
PLATELET # BLD AUTO: 209 X10*3/UL (ref 150–450)
POTASSIUM SERPL-SCNC: 4.4 MMOL/L (ref 3.5–5.3)
PROT SERPL-MCNC: 6.3 G/DL (ref 6.4–8.2)
PSA SERPL-MCNC: 0.42 NG/ML
RBC # BLD AUTO: 4.75 X10*6/UL (ref 4.5–5.9)
SODIUM SERPL-SCNC: 140 MMOL/L (ref 136–145)
TRIGL SERPL-MCNC: 79 MG/DL (ref 0–149)
TSH SERPL-ACNC: 1.78 MIU/L (ref 0.44–3.98)
VLDL: 16 MG/DL (ref 0–40)
WBC # BLD AUTO: 6.3 X10*3/UL (ref 4.4–11.3)

## 2024-10-16 PROCEDURE — 80053 COMPREHEN METABOLIC PANEL: CPT

## 2024-10-16 PROCEDURE — 83036 HEMOGLOBIN GLYCOSYLATED A1C: CPT

## 2024-10-16 PROCEDURE — 84153 ASSAY OF PSA TOTAL: CPT

## 2024-10-16 PROCEDURE — 85027 COMPLETE CBC AUTOMATED: CPT

## 2024-10-16 PROCEDURE — 84443 ASSAY THYROID STIM HORMONE: CPT

## 2024-10-16 PROCEDURE — 80061 LIPID PANEL: CPT

## 2024-10-16 PROCEDURE — 82306 VITAMIN D 25 HYDROXY: CPT

## 2024-10-17 LAB
EST. AVERAGE GLUCOSE BLD GHB EST-MCNC: 123 MG/DL
HBA1C MFR BLD: 5.9 %

## 2024-10-21 ENCOUNTER — APPOINTMENT (OUTPATIENT)
Dept: CARDIOLOGY | Facility: CLINIC | Age: 74
End: 2024-10-21
Payer: MEDICARE

## 2024-10-22 PROBLEM — I48.0 PAROXYSMAL ATRIAL FIBRILLATION (MULTI): Chronic | Status: ACTIVE | Noted: 2023-06-27

## 2024-10-22 NOTE — PROGRESS NOTES
"History Of Present Illness:      This is a 74-year-old male with a history of atrial fibrillation.  He presents for a follow-up evaluation.  The patient has been maintaining sinus rhythm for the most part,, but reports having 2 episodes of atrial fibrillation in April, 4 episodes of atrial fibrillation in August, and 1 episode in September 2024.  He has had no side effects on flecainide.    Past medical history:    Paroxysmal atrial fibrillation: Originally diagnosed in the setting of sepsis March 2019  Elevated coronary artery calcium score  Hyperlipidemia  PFO    Family history: Sister has atrial fibrillation and has had 2 ablation procedures    Review of Systems  Other review of systems negative     Last Recorded Vitals:      8/22/2023     3:03 PM 8/22/2023     3:18 PM 9/29/2023     9:57 AM 12/19/2023     2:06 PM 1/29/2024     9:25 AM 6/4/2024     1:16 PM 10/23/2024     2:07 PM   Vitals   Systolic 110 118 116 112 120 98 126   Diastolic 62 65 68 64 64 62 72   Heart Rate 64  62 74 60 58 68   Resp    16  18    Height (in)   1.854 m (6' 1\") 1.854 m (6' 1\") 1.854 m (6' 1\") 1.854 m (6' 1\") 1.854 m (6' 1\")   Weight (lb) 197  197 198 203 198 205   BMI 25.99 kg/m2  25.99 kg/m2 26.12 kg/m2 26.78 kg/m2 26.12 kg/m2 27.05 kg/m2   BSA (m2) 2.15 m2  2.15 m2 2.15 m2 2.18 m2 2.15 m2 2.19 m2   Visit Report    Report Report Report Report     Allergies:  Patient has no known allergies.    Outpatient Medications:  Current Outpatient Medications   Medication Instructions    atorvastatin (LIPITOR) 80 mg, oral, Daily    cholecalciferol (Vitamin D-3) 5,000 Units tablet Take by mouth.    desonide (DesOwen) 0.05 % cream 2 times daily    ezetimibe (ZETIA) 10 mg, oral, Daily    flecainide (TAMBOCOR) 150 mg, oral, Daily, Take 2 tabs in AM and 1 tab in PM    metoprolol succinate XL (Toprol-XL) 25 mg 24 hr tablet TAKE ONE AND ONE-HALF TABLETS DAILY    sildenafil (VIAGRA) 100 mg, Daily PRN    vit B6-mag cit,oxid-potass cit (Theralith XR) " 3.75-45-45-49.5 mg tablet extended release 2 times daily    VITAMIN B COMPLEX ORAL Refill(s) 0, Date: 06/21/2018 07:32:00 EDT    warfarin (Coumadin) 2.5 mg tablet Take 2.5 mg (1 tablet) every Monday, Tuesday, Wednesday, Friday, Saturday (with Warfarin 5 mg) or as directed by the anticoagulation clinic.    warfarin (Coumadin) 5 mg tablet Take 5 mg (1 tablet) daily or as directed by the anticoagulation clinic.     Physical Exam:    General Appearance:  Alert, oriented, no distress  Skin:  Warm and dry  Head and Neck:  No elevation of JVP, no carotid bruits  Cardiac Exam:  Rhythm is regular, S1 and S2 are normal, no murmur S3 or S4  Lungs:  Clear to auscultation  Extremities:  no edema  Neurologic:  No focal deficits  Psychiatric:  Appropriate mood and behavior    Cardiology Tests:  I have personally review the diagnostic cardiac testing and my interpretation is as follows:    Myocardial perfusion stress test 2021: No ischemia and normal left ventricular function  Echocardiogram 2021: Normal left ventricular function with mild to moderate mitral valve regurgitation and a PFO    Assessment/Plan     1.  Paroxysmal atrial fibrillation:  Osiel is in sinus rhythm, but he has had 7 episodes of atrial fibrillation in the past several months. I have recommended an increase in flecainide dosage. The flecainide will be increased to 100 mg in the morning and we will keep the evening dose at 50 mg. If the AF episodes continue we will then increase to 100 mg twice daily. I also discussed the option of pulmonary vein isolation which can be considered at any time if he wishes to proceed. For now he would prefer medical therapy. I provided patient literature to review. Also, the patient has expressed interest in changing to a DOAC rather than using warfarin. A referral has been placed to clinical pharmacy to see which of the DOACs would be most cost effective for him. He will be following up with me in 8 to 10 months, or sooner if  necessary.     James C Ramicone, DO

## 2024-10-23 ENCOUNTER — APPOINTMENT (OUTPATIENT)
Dept: CARDIOLOGY | Facility: CLINIC | Age: 74
End: 2024-10-23
Payer: MEDICARE

## 2024-10-23 ENCOUNTER — ANTICOAGULATION - WARFARIN VISIT (OUTPATIENT)
Dept: PHARMACY | Facility: CLINIC | Age: 74
End: 2024-10-23
Payer: MEDICARE

## 2024-10-23 VITALS
OXYGEN SATURATION: 96 % | SYSTOLIC BLOOD PRESSURE: 126 MMHG | DIASTOLIC BLOOD PRESSURE: 72 MMHG | HEART RATE: 68 BPM | WEIGHT: 205 LBS | HEIGHT: 73 IN | BODY MASS INDEX: 27.17 KG/M2

## 2024-10-23 DIAGNOSIS — I48.0 PAROXYSMAL ATRIAL FIBRILLATION (MULTI): Chronic | ICD-10-CM

## 2024-10-23 DIAGNOSIS — I48.0 PAROXYSMAL ATRIAL FIBRILLATION (MULTI): Primary | Chronic | ICD-10-CM

## 2024-10-23 DIAGNOSIS — Z79.899 HIGH RISK MEDICATION USE: ICD-10-CM

## 2024-10-23 DIAGNOSIS — I48.0 PAROXYSMAL A-FIB (MULTI): ICD-10-CM

## 2024-10-23 DIAGNOSIS — I48.0 PAROXYSMAL ATRIAL FIBRILLATION (MULTI): Primary | ICD-10-CM

## 2024-10-23 LAB
POC INR: 3.5
POC PROTHROMBIN TIME: NORMAL

## 2024-10-23 PROCEDURE — 1159F MED LIST DOCD IN RCRD: CPT | Performed by: INTERNAL MEDICINE

## 2024-10-23 PROCEDURE — 99215 OFFICE O/P EST HI 40 MIN: CPT | Performed by: INTERNAL MEDICINE

## 2024-10-23 PROCEDURE — 99212 OFFICE O/P EST SF 10 MIN: CPT

## 2024-10-23 PROCEDURE — 85610 PROTHROMBIN TIME: CPT | Mod: QW

## 2024-10-23 PROCEDURE — 1036F TOBACCO NON-USER: CPT | Performed by: INTERNAL MEDICINE

## 2024-10-23 RX ORDER — FLECAINIDE ACETATE 50 MG/1
150 TABLET ORAL DAILY
Qty: 270 TABLET | Refills: 3 | Status: SHIPPED | OUTPATIENT
Start: 2024-10-23 | End: 2025-10-23

## 2024-10-23 NOTE — ASSESSMENT & PLAN NOTE
Osiel is in sinus rhythm, but he has had 7 episodes of atrial fibrillation in the past several months.  I have recommended an increase in flecainide dosage.  The flecainide will be increased to 100 mg in the morning and we will keep the evening dose at 50 mg.  If the AF episodes continue we will then increase to 100 mg twice daily.  I also discussed the option of pulmonary vein isolation which can be considered at any time if he wishes to proceed.  For now he would prefer medical therapy.  I provided patient literature to review.  Also, the patient has expressed interest in changing to a DOAC rather than using warfarin.  A referral has been placed to clinical pharmacy to see which of the DOACs would be most cost effective for him.  He will be following up with me in 8 to 10 months, or sooner if necessary.

## 2024-10-23 NOTE — PROGRESS NOTES
Coumadin Clinic Visit Note    Patient verified warfarin dose  No missed doses  Patient states that there is bruising on the back of his left leg. Potentially due to exercising.  No changes to medications  Denies alcohol/smoking use  No tylenol/ibuprofen use  Consistent dietary green intake  No anticipated procedures at this time  INR Supratherapeutic today at 3.5    Plan:   Hold warfarin dose today, then resume normal home dosing.  Next appointment 2 weeks      Fany Bianchi, FarrukhD

## 2024-11-06 ENCOUNTER — ANTICOAGULATION - WARFARIN VISIT (OUTPATIENT)
Dept: PHARMACY | Facility: CLINIC | Age: 74
End: 2024-11-06
Payer: MEDICARE

## 2024-11-06 DIAGNOSIS — I48.0 PAROXYSMAL ATRIAL FIBRILLATION (MULTI): Primary | Chronic | ICD-10-CM

## 2024-11-06 LAB
POC INR: 2.3
POC PROTHROMBIN TIME: NORMAL

## 2024-11-06 PROCEDURE — 85610 PROTHROMBIN TIME: CPT | Mod: QW

## 2024-11-06 PROCEDURE — 99212 OFFICE O/P EST SF 10 MIN: CPT

## 2024-11-06 NOTE — PROGRESS NOTES
Coumadin Clinic Visit Note    Patient verified warfarin dose  No missed doses  On last visit, patient noted bruising on the back of his left leg. Potentially due to exercise, but is improving.   No changes to medications  Patient may potentially be switching Eliquis and Xarelto.  Consistent dietary green intake  No anticipated procedures at this time  INR Therapeutic today at 2.3    Plan:   Patient was instructed to call us if he is switched to Eliquis or Xarelto, and we will discharge him from the clinic.  No changes to warfarin dose today  Next appointment 5 weeks      Fany Bianchi, FarrukhD

## 2024-11-25 ENCOUNTER — APPOINTMENT (OUTPATIENT)
Dept: PHARMACY | Facility: HOSPITAL | Age: 74
End: 2024-11-25
Payer: MEDICARE

## 2024-11-25 DIAGNOSIS — I48.0 PAROXYSMAL ATRIAL FIBRILLATION (MULTI): ICD-10-CM

## 2024-11-25 NOTE — PROGRESS NOTES
Pharmacist Clinic: Cardiology Management    Osiel Peterson is a 74 y.o. male was referred to Clinical Pharmacy Team for anticoag management.     Referring Provider: Ramicone, James C, DO    THIS IS A NEW PATIENT APPOINTMENT. PATIENT WILL BE ESTABLISHING CARE WITH CLINICAL PHARMACY.    Appointment was completed by Osiel who was reached at [phone].        Allergies Reviewed? Yes    No Known Allergies    Past Medical History:   Diagnosis Date    Encounter for follow-up examination after completed treatment for conditions other than malignant neoplasm 11/19/2018    Postop check    Gram-negative sepsis, unspecified (Multi) 04/03/2019    Shock, septic, Gram negative    Other specified abnormal findings of blood chemistry 08/07/2020    Abnormal LFTs    Pain in left hip 09/28/2018    Left hip pain    Pain in unspecified hip 03/17/2015    Coxalgia    Paroxysmal atrial fibrillation (Multi) 06/27/2023    Pt with afib with RVR in setting of sepsis during 3/2019 admission ... subsequently had ep/of afib on 4/2019 ZioPatch (average HR=97) ... Had transient ep/of palpitations while exercising 1/30/20  [id number] score =1, thus moderate risk.   Was on Xarelto in past but had hematuria, thus was stopped and had been on ASA ... No additional hematuria, thus placed back on Xarelto.  Because of cost, tra    Personal history of other endocrine, nutritional and metabolic disease     History of hyperlipidemia    Unspecified injury of lower back, initial encounter 04/01/2015    Tailbone injury       Current Outpatient Medications on File Prior to Visit   Medication Sig Dispense Refill    atorvastatin (Lipitor) 80 mg tablet TAKE 1 TABLET DAILY 90 tablet 3    cholecalciferol (Vitamin D-3) 5,000 Units tablet Take by mouth.      desonide (DesOwen) 0.05 % cream 2 times a day.      ezetimibe (Zetia) 10 mg tablet TAKE 1 TABLET DAILY 90 tablet 3    flecainide (Tambocor) 50 mg tablet Take 3 tablets (150 mg) by mouth once daily. Take 2 tabs in  "AM and 1 tab in  tablet 3    metoprolol succinate XL (Toprol-XL) 25 mg 24 hr tablet TAKE ONE AND ONE-HALF TABLETS DAILY (Patient taking differently: Take 1 tablet (25 mg) by mouth once daily. TAKE ONE AND ONE-HALF TABLETS DAILY) 90 tablet 3    sildenafil (Viagra) 100 mg tablet Take 1 tablet (100 mg) by mouth once daily as needed.      vit B6-mag cit,oxid-potass cit (Theralith XR) 3.75-45-45-49.5 mg tablet extended release Take by mouth twice a day.      VITAMIN B COMPLEX ORAL Refill(s) 0, Date: 06/21/2018 07:32:00 EDT      warfarin (Coumadin) 2.5 mg tablet Take 2.5 mg (1 tablet) every Monday, Tuesday, Wednesday, Friday, Saturday (with Warfarin 5 mg) or as directed by the anticoagulation clinic. 65 tablet 1    warfarin (Coumadin) 5 mg tablet Take 5 mg (1 tablet) daily or as directed by the anticoagulation clinic. 90 tablet 1     No current facility-administered medications on file prior to visit.         RELEVANT LAB RESULTS:  Lab Results   Component Value Date    BILITOT 0.9 10/16/2024    CALCIUM 8.9 10/16/2024    CO2 30 10/16/2024     10/16/2024    CREATININE 1.12 10/16/2024    GLUCOSE 72 (L) 10/16/2024    ALKPHOS 65 10/16/2024    K 4.4 10/16/2024    PROT 6.3 (L) 10/16/2024     10/16/2024    AST 25 10/16/2024    ALT 28 10/16/2024    BUN 17 10/16/2024    ANIONGAP 10 10/16/2024    ALBUMIN 4.0 10/16/2024    GFRMALE 70 06/22/2023     Lab Results   Component Value Date    TRIG 79 10/16/2024    CHOL 127 10/16/2024    LDLCALC 62 10/16/2024    HDL 49.2 10/16/2024     No results found for: \"BMCBC\", \"CBCDIF\"     PHARMACEUTICAL ASSESSMENT:    MEDICATION RECONCILIATION    Home Pharmacy Reviewed? Yes, describe: [facility]          Drug Interactions? No    Medication Documentation Review Audit       Reviewed by Francesca Mckeon, CMA (Medical Assistant) on 10/23/24 at 1411      Medication Order Taking? Sig Documenting Provider Last Dose Status   atorvastatin (Lipitor) 80 mg tablet " [id number] Yes TAKE 1 TABLET DAILY Tripp Uriarte, DO Taking Active   cholecalciferol (Vitamin D-3) 5,000 Units tablet [id number] Yes Take by mouth. Historical Provider, MD Taking Active   desonide (DesOwen) 0.05 % cream [id number] Yes 2 times a day. Historical Provider, MD Taking Active   ezetimibe (Zetia) 10 mg tablet [id number] Yes TAKE 1 TABLET DAILY Tripp Uriarte DO Taking Active   flecainide (Tambocor) 50 mg tablet [id number] Yes Take 1 tablet (50 mg) by mouth 2 times a day. James C Ramicone, DO  Active   metoprolol succinate XL (Toprol-XL) 25 mg 24 hr tablet [id number] Yes TAKE ONE AND ONE-HALF TABLETS DAILY   Patient taking differently: Take 1 tablet (25 mg) by mouth once daily. TAKE ONE AND ONE-HALF TABLETS DAILY    Blayne Long,  Taking Differently Active   sildenafil (Viagra) 100 mg tablet [id number] Yes Take 1 tablet (100 mg) by mouth once daily as needed. Historical Provider, MD Taking Active   vit B6-mag cit,oxid-potass cit (Theralith XR) 3.75-45-45-49.5 mg tablet extended release [id number] Yes Take by mouth twice a day. Historical Provider, MD Taking Active   VITAMIN B COMPLEX ORAL [id number] Yes Refill(s) 0, Date: 06/21/2018 07:32:00 EDT Historical Provider, MD Taking Active   warfarin (Coumadin) 2.5 mg tablet [id number] Yes Take 2.5 mg (1 tablet) every Monday, Tuesday, Wednesday, Friday, Saturday (with Warfarin 5 mg) or as directed by the anticoagulation clinic. Blayne Long DO  Active   warfarin (Coumadin) 5 mg tablet [id number] Yes Take 5 mg (1 tablet) daily or as directed by the anticoagulation clinic. Blayne Long DO  Active                    DISEASE MANAGEMENT ASSESSMENT:   ANTICOAGULATION ASSESSMENT    The ASCVD Risk score (Linda HILL, et al., 2019) failed to calculate for the following reasons:    The valid total cholesterol range is 130 to 320 mg/dL    DIAGNOSIS: prevention of nonvalvular atrial fibrilliation stroke and systemic embolism  - Patient is projected to be  on anticoagulation indefinite  - [id number] Score: [3] (only included if diagnosis is atrial fibrillation)   Age: [<65 (0)] [65-74 (+1)] [> 75 (+2)]: 2  Sex: [Male/Female (+1)]: 0  CHF history: [No/Yes(+1)]: 0  Hypertension history: [No/Yes(+1)]: 1  Stroke/TIA/thromboembolism history: [No/Yes(+2)]: 0  Vascular disease history (prior MI, peripheral artery disease, aortic plaque): [No/Yes(+1)]: 0  Diabetes history: [No/Yes(+1)]: 0    CURRENT PHARMACOTHERAPY:   Warfarin dosed per coumadin clinic    Affordability/Accessibility: eliquis would be $42  Adherence/Organization:   Adverse Reactions: has had some significant bruising on warfarin.   Recent Hospitalizations: no  Recent Falls/Trauma: no      EDUCATION/COUNSELING:   - Counseled patient on MOA, expectations, duration of therapy, contraindications, administration, and monitoring parameters  - Counseled patient of side effects that are indicative of bleeding such as dark tarry stool, unexplainable bruising, or vomiting up a coffee ground like substance          DISCUSSION/NOTES:    Patient Assistance Program (PAP)    Application for program to be submitted for the following medications: [facility] Permanent Address: [facility]   Prescription Insurance:   Yes   Members of Household: 2   Files Taxes: Yes         Patient verbally reports monthly or yearly income which would be more than 400% federal poverty level    At this time patient does not qualify for assistance.  We did discuss current eliquis/xarelto copay is $42. Discussed elimination of donut hole next year. Patient will check with plan so see what 2025 cost will look like. If reasonable we will discuss switch on 12/6      ASSESSMENT:    Assessment/Plan   Problem List Items Addressed This Visit    None        RECOMMENDATIONS/PLAN:    Over income for assistance. Patient will check with plan so see what 2025 cost will look like. If reasonable we will discuss switch on 12/6    Last Appnt with Referring  Provider: 10/23/2024  Next Appnt with Referring Provider: 07/28/2025  Clinical Pharmacist follow up: 12/6 @ 3p  Type of Encounter: Chen Wahl PharmD    Verbal consent to manage patient's drug therapy was obtained from the patient . They were informed they may decline to participate or withdraw from participation in pharmacy services at any time.    Continue all meds under the continuation of care with the referring provider and clinical pharmacy team.

## 2024-12-05 ENCOUNTER — APPOINTMENT (OUTPATIENT)
Dept: PRIMARY CARE | Facility: CLINIC | Age: 74
End: 2024-12-05
Payer: MEDICARE

## 2024-12-05 VITALS
HEART RATE: 55 BPM | WEIGHT: 207 LBS | DIASTOLIC BLOOD PRESSURE: 68 MMHG | RESPIRATION RATE: 14 BRPM | OXYGEN SATURATION: 98 % | BODY MASS INDEX: 27.31 KG/M2 | SYSTOLIC BLOOD PRESSURE: 132 MMHG

## 2024-12-05 DIAGNOSIS — I48.0 PAROXYSMAL ATRIAL FIBRILLATION (MULTI): Chronic | ICD-10-CM

## 2024-12-05 DIAGNOSIS — J18.9 PNEUMONIA OF RIGHT LUNG DUE TO INFECTIOUS ORGANISM, UNSPECIFIED PART OF LUNG: Primary | ICD-10-CM

## 2024-12-05 PROCEDURE — 1159F MED LIST DOCD IN RCRD: CPT | Performed by: INTERNAL MEDICINE

## 2024-12-05 PROCEDURE — G2211 COMPLEX E/M VISIT ADD ON: HCPCS | Performed by: INTERNAL MEDICINE

## 2024-12-05 PROCEDURE — 99214 OFFICE O/P EST MOD 30 MIN: CPT | Performed by: INTERNAL MEDICINE

## 2024-12-05 PROCEDURE — 1126F AMNT PAIN NOTED NONE PRSNT: CPT | Performed by: INTERNAL MEDICINE

## 2024-12-05 RX ORDER — DOXYCYCLINE 100 MG/1
100 CAPSULE ORAL 2 TIMES DAILY
Qty: 14 CAPSULE | Refills: 0 | Status: SHIPPED | OUTPATIENT
Start: 2024-12-05 | End: 2024-12-12

## 2024-12-05 ASSESSMENT — ENCOUNTER SYMPTOMS
LOSS OF SENSATION IN FEET: 0
OCCASIONAL FEELINGS OF UNSTEADINESS: 0
DEPRESSION: 0

## 2024-12-05 ASSESSMENT — PAIN SCALES - GENERAL: PAINLEVEL_OUTOF10: 0-NO PAIN

## 2024-12-06 ENCOUNTER — APPOINTMENT (OUTPATIENT)
Dept: PHARMACY | Facility: HOSPITAL | Age: 74
End: 2024-12-06
Payer: MEDICARE

## 2024-12-11 ENCOUNTER — ANTICOAGULATION - WARFARIN VISIT (OUTPATIENT)
Dept: PHARMACY | Facility: CLINIC | Age: 74
End: 2024-12-11
Payer: MEDICARE

## 2024-12-11 DIAGNOSIS — I48.0 PAROXYSMAL ATRIAL FIBRILLATION (MULTI): Primary | Chronic | ICD-10-CM

## 2024-12-11 LAB
POC INR: 3.1
POC PROTHROMBIN TIME: NORMAL

## 2024-12-11 PROCEDURE — 99212 OFFICE O/P EST SF 10 MIN: CPT

## 2024-12-11 PROCEDURE — 85610 PROTHROMBIN TIME: CPT | Mod: QW

## 2024-12-11 NOTE — PROGRESS NOTES
Coumadin Clinic Visit Note    Patient verified warfarin dose  No missed doses  No unusual bruising or bleeding  Pt states he now takes flecainide 50mg BID - pt was on 25mg daily  Consistent dietary green intake  No anticipated procedures at this time    Pt states that Dr. Ramicone will be converting pt to Eliquis - clarify on 01/13 if will be switching.  INR Supratherapeutic today at 3.1  Will have pt take only 5 mg today  Next appointment 5 weeks      Nathalie Naqvi, FarrukhD

## 2024-12-14 DIAGNOSIS — E78.2 MIXED HYPERLIPIDEMIA: ICD-10-CM

## 2024-12-14 DIAGNOSIS — I47.20 VENTRICULAR TACHYCARDIA (MULTI): ICD-10-CM

## 2024-12-14 DIAGNOSIS — I48.0 PAROXYSMAL A-FIB (MULTI): ICD-10-CM

## 2024-12-15 ASSESSMENT — ENCOUNTER SYMPTOMS: COUGH: 1

## 2024-12-16 RX ORDER — ATORVASTATIN CALCIUM 80 MG/1
80 TABLET, FILM COATED ORAL DAILY
Qty: 90 TABLET | Refills: 3 | Status: SHIPPED | OUTPATIENT
Start: 2024-12-16

## 2024-12-16 RX ORDER — METOPROLOL SUCCINATE 25 MG/1
25 TABLET, EXTENDED RELEASE ORAL DAILY
COMMUNITY
Start: 2024-12-16

## 2024-12-16 NOTE — PROGRESS NOTES
Subjective   Osiel Peterson is a 74 y.o. male who presents for Cough (Pt is being seen for cough with yellow phlegm x 2 weeks).  Patient presents for a sick visit.  Complains of a productive cough for the past 2 weeks.  He is having yellow sputum.  He has Rales on exam.  She admits to sick contacts.  Patient recently has flecainide increased.  He has had no instances of atrial fibrillation since.    Cough        Objective     /68   Pulse 55   Resp 14   Wt 93.9 kg (207 lb)   SpO2 98%   BMI 27.31 kg/m²      Physical Exam  Constitutional:       Appearance: Normal appearance.   HENT:      Head: Normocephalic and atraumatic.      Nose: Nose normal.      Mouth/Throat:      Mouth: Mucous membranes are moist.      Pharynx: Oropharynx is clear.   Eyes:      Extraocular Movements: Extraocular movements intact.      Pupils: Pupils are equal, round, and reactive to light.   Cardiovascular:      Rate and Rhythm: Normal rate and regular rhythm.   Pulmonary:      Effort: No respiratory distress.      Breath sounds: Rales present. No wheezing or rhonchi.   Abdominal:      General: Bowel sounds are normal. There is no distension.      Palpations: Abdomen is soft.      Tenderness: There is no abdominal tenderness. There is no guarding.   Musculoskeletal:      Right lower leg: No edema.      Left lower leg: No edema.   Skin:     General: Skin is warm and dry.   Neurological:      Mental Status: He is alert and oriented to person, place, and time. Mental status is at baseline.   Psychiatric:         Mood and Affect: Mood normal.         Behavior: Behavior normal.         Assessment/Plan   Problem List Items Addressed This Visit       Paroxysmal atrial fibrillation (Multi) (Chronic)     Continue current medications          Other Visit Diagnoses       Pneumonia of right lung due to infectious organism, unspecified part of lung    -  Primary          Patient prescribed antibiotics for pneumonia given his inspiratory and expiratory  maite on exam       Tripp Uriarte, DO

## 2025-01-06 ENCOUNTER — APPOINTMENT (OUTPATIENT)
Dept: PHARMACY | Facility: HOSPITAL | Age: 75
End: 2025-01-06
Payer: MEDICARE

## 2025-01-13 ENCOUNTER — APPOINTMENT (OUTPATIENT)
Dept: PHARMACY | Facility: CLINIC | Age: 75
End: 2025-01-13
Payer: MEDICARE

## 2025-01-17 ENCOUNTER — APPOINTMENT (OUTPATIENT)
Dept: PHARMACY | Facility: HOSPITAL | Age: 75
End: 2025-01-17
Payer: MEDICARE

## 2025-01-17 DIAGNOSIS — I48.0 PAROXYSMAL ATRIAL FIBRILLATION (MULTI): Primary | ICD-10-CM

## 2025-01-17 RX ORDER — APIXABAN 5 MG/1
5 TABLET, FILM COATED ORAL 2 TIMES DAILY
Qty: 180 TABLET | Refills: 3 | Status: SHIPPED | OUTPATIENT
Start: 2025-01-17 | End: 2026-01-17

## 2025-01-17 NOTE — PROGRESS NOTES
Pharmacist Clinic: Cardiology Management    Osiel Peterson is a 74 y.o. male was referred to Clinical Pharmacy Team for anticoag management.     Referring Provider: Ramicone, James C, DO    THIS IS A FOLLOW UP PATIENT APPOINTMENT. AT LAST VISIT ON 11/25 WITH PHARMACIST (lissett).    Appointment was completed by Osiel who was reached at [phone].        Allergies Reviewed? Yes    No Known Allergies    Past Medical History:   Diagnosis Date    Encounter for follow-up examination after completed treatment for conditions other than malignant neoplasm 11/19/2018    Postop check    Gram-negative sepsis, unspecified (Multi) 04/03/2019    Shock, septic, Gram negative    Other specified abnormal findings of blood chemistry 08/07/2020    Abnormal LFTs    Pain in left hip 09/28/2018    Left hip pain    Pain in unspecified hip 03/17/2015    Coxalgia    Paroxysmal atrial fibrillation (Multi) 06/27/2023    Pt with afib with RVR in setting of sepsis during 3/2019 admission ... subsequently had ep/of afib on 4/2019 ZioPatch (average HR=97) ... Had transient ep/of palpitations while exercising 1/30/20  [id number] score =1, thus moderate risk.   Was on Xarelto in past but had hematuria, thus was stopped and had been on ASA ... No additional hematuria, thus placed back on Xarelto.  Because of cost, tra    Personal history of other endocrine, nutritional and metabolic disease     History of hyperlipidemia    Unspecified injury of lower back, initial encounter 04/01/2015    Tailbone injury       Current Outpatient Medications on File Prior to Visit   Medication Sig Dispense Refill    atorvastatin (Lipitor) 80 mg tablet TAKE 1 TABLET DAILY 90 tablet 3    cholecalciferol (Vitamin D-3) 5,000 Units tablet Take by mouth.      desonide (DesOwen) 0.05 % cream 2 times a day.      ezetimibe (Zetia) 10 mg tablet TAKE 1 TABLET DAILY 90 tablet 3    flecainide (Tambocor) 50 mg tablet Take 3 tablets (150 mg) by mouth once daily. Take 2 tabs in AM  "and 1 tab in  tablet 3    metoprolol succinate XL (Toprol-XL) 25 mg 24 hr tablet TAKE ONE AND ONE-HALF TABLETS DAILY (Patient taking differently: Take 1 tablet (25 mg) by mouth once daily. TAKE ONE AND ONE-HALF TABLETS DAILY) 90 tablet 3    sildenafil (Viagra) 100 mg tablet Take 1 tablet (100 mg) by mouth once daily as needed.      vit B6-mag cit,oxid-potass cit (Theralith XR) 3.75-45-45-49.5 mg tablet extended release Take by mouth twice a day.      VITAMIN B COMPLEX ORAL Refill(s) 0, Date: 06/21/2018 07:32:00 EDT      warfarin (Coumadin) 2.5 mg tablet Take 2.5 mg (1 tablet) every Monday, Tuesday, Wednesday, Friday, Saturday (with Warfarin 5 mg) or as directed by the anticoagulation clinic. 65 tablet 1    warfarin (Coumadin) 5 mg tablet Take 5 mg (1 tablet) daily or as directed by the anticoagulation clinic. 90 tablet 1     No current facility-administered medications on file prior to visit.         RELEVANT LAB RESULTS:  Lab Results   Component Value Date    BILITOT 0.9 10/16/2024    CALCIUM 8.9 10/16/2024    CO2 30 10/16/2024     10/16/2024    CREATININE 1.12 10/16/2024    GLUCOSE 72 (L) 10/16/2024    ALKPHOS 65 10/16/2024    K 4.4 10/16/2024    PROT 6.3 (L) 10/16/2024     10/16/2024    AST 25 10/16/2024    ALT 28 10/16/2024    BUN 17 10/16/2024    ANIONGAP 10 10/16/2024    ALBUMIN 4.0 10/16/2024    GFRMALE 70 06/22/2023     Lab Results   Component Value Date    TRIG 79 10/16/2024    CHOL 127 10/16/2024    LDLCALC 62 10/16/2024    HDL 49.2 10/16/2024     No results found for: \"BMCBC\", \"CBCDIF\"     PHARMACEUTICAL ASSESSMENT:    MEDICATION RECONCILIATION    Home Pharmacy Reviewed? Yes, describe: [facility]          Drug Interactions? No    Medication Documentation Review Audit       Reviewed by Reema Evans RN (Registered Nurse) on 12/05/24 at 1313      Medication Order Taking? Sig Documenting Provider Last Dose Status   atorvastatin (Lipitor) 80 mg tablet [id number] No " TAKE 1 TABLET DAILY Tripp Uriarte DO Taking Active   cholecalciferol (Vitamin D-3) 5,000 Units tablet [id number] No Take by mouth. Historical Provider, MD Taking Active   desonide (DesOwen) 0.05 % cream [id number] No 2 times a day. Historical Provider, MD Taking Active   ezetimibe (Zetia) 10 mg tablet [id number] No TAKE 1 TABLET DAILY Tripp Uriarte DO Taking Active   flecainide (Tambocor) 50 mg tablet [id number]  Take 3 tablets (150 mg) by mouth once daily. Take 2 tabs in AM and 1 tab in PM James C Ramicone, DO  Active   metoprolol succinate XL (Toprol-XL) 25 mg 24 hr tablet [id number] No TAKE ONE AND ONE-HALF TABLETS DAILY   Patient taking differently: Take 1 tablet (25 mg) by mouth once daily. TAKE ONE AND ONE-HALF TABLETS DAILY    Blayne Long DO Taking Differently Active   sildenafil (Viagra) 100 mg tablet [id number] No Take 1 tablet (100 mg) by mouth once daily as needed. Historical Provider, MD Taking Active   vit B6-mag cit,oxid-potass cit (Theralith XR) 3.75-45-45-49.5 mg tablet extended release [id number] No Take by mouth twice a day. Historical Provider, MD Taking Active   VITAMIN B COMPLEX ORAL [id number] No Refill(s) 0, Date: 06/21/2018 07:32:00 EDT Historical Provider, MD Taking Active   warfarin (Coumadin) 2.5 mg tablet [id number]  Take 2.5 mg (1 tablet) every Monday, Tuesday, Wednesday, Friday, Saturday (with Warfarin 5 mg) or as directed by the anticoagulation clinic. Blayne Long DO  Active   warfarin (Coumadin) 5 mg tablet [id number]  Take 5 mg (1 tablet) daily or as directed by the anticoagulation clinic. Blayne Long DO  Active                    DISEASE MANAGEMENT ASSESSMENT:   ANTICOAGULATION ASSESSMENT    The ASCVD Risk score (Linda HILL, et al., 2019) failed to calculate for the following reasons:    The valid total cholesterol range is 130 to 320 mg/dL    DIAGNOSIS: prevention of nonvalvular atrial fibrilliation stroke and systemic embolism  - Patient is projected to  be on anticoagulation indefinite  - [id number] Score: [3] (only included if diagnosis is atrial fibrillation)   Age: [<65 (0)] [65-74 (+1)] [> 75 (+2)]: 2  Sex: [Male/Female (+1)]: 0  CHF history: [No/Yes(+1)]: 0  Hypertension history: [No/Yes(+1)]: 1  Stroke/TIA/thromboembolism history: [No/Yes(+2)]: 0  Vascular disease history (prior MI, peripheral artery disease, aortic plaque): [No/Yes(+1)]: 0  Diabetes history: [No/Yes(+1)]: 0    CURRENT PHARMACOTHERAPY:   Warfarin dosed per coumadin clinic    Affordability/Accessibility: eliquis would be $42  Adherence/Organization: no issues noted  Adverse Reactions: has had some significant bruising on warfarin.   Recent Hospitalizations: no  Recent Falls/Trauma: no      EDUCATION/COUNSELING:   - Counseled patient on MOA, expectations, duration of therapy, contraindications, administration, and monitoring parameters  - Counseled patient of side effects that are indicative of bleeding such as dark tarry stool, unexplainable bruising, or vomiting up a coffee ground like substance          DISCUSSION/NOTES:   - over income for patient assistance  - eliquis $42/month   -switch from warfarin to eliquis as outlined below    ASSESSMENT:    Assessment/Plan   Problem List Items Addressed This Visit    None        RECOMMENDATIONS/PLAN:    Patient Instructions for Eliquis Conversion  Hold warfarin for 3 days (Last dose: 1/20/25)  Get INR check on the 4th day (Date Scheduled: 1/24/25)  Speak with Clinical Pharmacist After INR Draw:   INR must be <2 to start Eliquis. Do NOT start until instructed by Clinical Pharmacist at appointment.     Last Appnt with Referring Provider: 10/23/2024  Next Appnt with Referring Provider: 07/28/2025  Clinical Pharmacist follow up: 1/31 @ 1130a scheduled visit patient will call 1/24  Type of Encounter: Chen Wahl PharmD    Verbal consent to manage patient's drug therapy was obtained from the patient . They were informed they may decline  to participate or withdraw from participation in pharmacy services at any time.    Continue all meds under the continuation of care with the referring provider and clinical pharmacy team.

## 2025-01-24 ENCOUNTER — ANTICOAGULATION - WARFARIN VISIT (OUTPATIENT)
Dept: PHARMACY | Facility: CLINIC | Age: 75
End: 2025-01-24
Payer: MEDICARE

## 2025-01-24 DIAGNOSIS — I48.0 PAROXYSMAL ATRIAL FIBRILLATION (MULTI): Primary | Chronic | ICD-10-CM

## 2025-01-24 LAB
POC INR: 1.8
POC PROTHROMBIN TIME: NORMAL

## 2025-01-24 PROCEDURE — 85610 PROTHROMBIN TIME: CPT | Mod: QW | Performed by: PHARMACIST

## 2025-01-24 PROCEDURE — 99212 OFFICE O/P EST SF 10 MIN: CPT | Performed by: PHARMACIST

## 2025-01-24 NOTE — PROGRESS NOTES
Coumadin Clinic Visit Note    Patient verified warfarin dose  No missed doses  No unusual bruising or bleeding  No changes to medications  Consistent dietary green intake  No anticipated procedures at this time  INR Subtherapeutic today at 1.8  No changes to warfarin dose today   Denia is here today to check INR , from previous note   Patient Instructions for Eliquis Conversion  Hold warfarin for 3 days (Last dose: 1/20/25)  Get INR check on the 4th day (Date Scheduled: 1/24/25)  Speak with Clinical Pharmacist After INR Draw:   INR must be <2 to start Eliquis. Do NOT start until instructed by Clinical Pharmacist at appointment   INR is 1.8 today so safe to start eliquis , denia have prescription , , will dschrge from coumadin clinic       Therese Alves, FarrukhD

## 2025-01-24 NOTE — LETTER
January 24, 2025     Blayne Long DO  [address]  [address]  [place] [address]    Patient: Osiel Peterson   YOB: 1950   Date of Visit: 1/24/2025       Dear Dr. Blayne Long DO:    Thank you for referring Osiel Peterson to me for evaluation. Below are my notes for this consultation.  If you have questions, please do not hesitate to call me. I look forward to following your patient along with you.     Patient have been discharged from anticoagulation clinic since he started taking eliquis now   Sincerely,     Therese Alves [facility]      CC: No Recipients  ______________________________________________________________________________________

## 2025-01-31 ENCOUNTER — APPOINTMENT (OUTPATIENT)
Dept: PHARMACY | Facility: HOSPITAL | Age: 75
End: 2025-01-31
Payer: MEDICARE

## 2025-01-31 DIAGNOSIS — I48.0 PAROXYSMAL ATRIAL FIBRILLATION (MULTI): ICD-10-CM

## 2025-01-31 RX ORDER — APIXABAN 5 MG/1
5 TABLET, FILM COATED ORAL 2 TIMES DAILY
Qty: 180 TABLET | Refills: 3 | Status: SHIPPED | OUTPATIENT
Start: 2025-01-31 | End: 2026-01-31

## 2025-01-31 NOTE — PROGRESS NOTES
Pharmacist Clinic: Cardiology Management    Osiel Peterson is a 74 y.o. male was referred to Clinical Pharmacy Team for anticoag management.     Referring Provider: Ramicone, James C, DO    THIS IS A FOLLOW UP PATIENT APPOINTMENT. AT LAST VISIT ON 1/17 WITH PHARMACIST ([email or web address]).    Appointment was completed by Osiel who was reached at [phone].        Allergies Reviewed? Yes    No Known Allergies    Past Medical History:   Diagnosis Date    Encounter for follow-up examination after completed treatment for conditions other than malignant neoplasm 11/19/2018    Postop check    Gram-negative sepsis, unspecified (Multi) 04/03/2019    Shock, septic, Gram negative    Other specified abnormal findings of blood chemistry 08/07/2020    Abnormal LFTs    Pain in left hip 09/28/2018    Left hip pain    Pain in unspecified hip 03/17/2015    Coxalgia    Paroxysmal atrial fibrillation (Multi) 06/27/2023    Pt with afib with RVR in setting of sepsis during 3/2019 admission ... subsequently had ep/of afib on 4/2019 ZioPatch (average HR=97) ... Had transient ep/of palpitations while exercising 1/30/20  [id number] score =1, thus moderate risk.   Was on Xarelto in past but had hematuria, thus was stopped and had been on ASA ... No additional hematuria, thus placed back on Xarelto.  Because of cost, tra    Personal history of other endocrine, nutritional and metabolic disease     History of hyperlipidemia    Unspecified injury of lower back, initial encounter 04/01/2015    Tailbone injury       Current Outpatient Medications on File Prior to Visit   Medication Sig Dispense Refill    atorvastatin (Lipitor) 80 mg tablet TAKE 1 TABLET DAILY 90 tablet 3    cholecalciferol (Vitamin D-3) 5,000 Units tablet Take by mouth.      desonide (DesOwen) 0.05 % cream 2 times a day.      Eliquis 5 mg tablet Take 1 tablet (5 mg) by mouth 2 times a day. 180 tablet 3    ezetimibe (Zetia) 10 mg tablet TAKE 1 TABLET DAILY 90 tablet 3    flecainide  "(Tambocor) 50 mg tablet Take 3 tablets (150 mg) by mouth once daily. Take 2 tabs in AM and 1 tab in  tablet 3    metoprolol succinate XL (Toprol-XL) 25 mg 24 hr tablet TAKE ONE AND ONE-HALF TABLETS DAILY (Patient taking differently: Take 1 tablet (25 mg) by mouth once daily. TAKE ONE AND ONE-HALF TABLETS DAILY) 90 tablet 3    sildenafil (Viagra) 100 mg tablet Take 1 tablet (100 mg) by mouth once daily as needed.      vit B6-mag cit,oxid-potass cit (Theralith XR) 3.75-45-45-49.5 mg tablet extended release Take by mouth twice a day.      VITAMIN B COMPLEX ORAL Refill(s) 0, Date: 06/21/2018 07:32:00 EDT       No current facility-administered medications on file prior to visit.         RELEVANT LAB RESULTS:  Lab Results   Component Value Date    BILITOT 0.9 10/16/2024    CALCIUM 8.9 10/16/2024    CO2 30 10/16/2024     10/16/2024    CREATININE 1.12 10/16/2024    GLUCOSE 72 (L) 10/16/2024    ALKPHOS 65 10/16/2024    K 4.4 10/16/2024    PROT 6.3 (L) 10/16/2024     10/16/2024    AST 25 10/16/2024    ALT 28 10/16/2024    BUN 17 10/16/2024    ANIONGAP 10 10/16/2024    ALBUMIN 4.0 10/16/2024    GFRMALE 70 06/22/2023     Lab Results   Component Value Date    TRIG 79 10/16/2024    CHOL 127 10/16/2024    LDLCALC 62 10/16/2024    HDL 49.2 10/16/2024     No results found for: \"BMCBC\", \"CBCDIF\"     PHARMACEUTICAL ASSESSMENT:    MEDICATION RECONCILIATION    Home Pharmacy Reviewed? Yes, describe: [facility]          Drug Interactions? No    Medication Documentation Review Audit       Reviewed by Reema Evans RN (Registered Nurse) on 12/05/24 at 1313      Medication Order Taking? Sig Documenting Provider Last Dose Status   atorvastatin (Lipitor) 80 mg tablet [id number] No TAKE 1 TABLET DAILY Tripp Uriarte, DO Taking Active   cholecalciferol (Vitamin D-3) 5,000 Units tablet [id number] No Take by mouth. Historical Provider, MD Taking Active   desonide (DesOwen) 0.05 % cream [id number] No 2 " times a day. Historical Provider, MD Taking Active   ezetimibe (Zetia) 10 mg tablet [id number] No TAKE 1 TABLET DAILY Tripp Uriarte DO Taking Active   flecainide (Tambocor) 50 mg tablet [id number]  Take 3 tablets (150 mg) by mouth once daily. Take 2 tabs in AM and 1 tab in PM James C Ramicone,   Active   metoprolol succinate XL (Toprol-XL) 25 mg 24 hr tablet [id number] No TAKE ONE AND ONE-HALF TABLETS DAILY   Patient taking differently: Take 1 tablet (25 mg) by mouth once daily. TAKE ONE AND ONE-HALF TABLETS DAILY    Blayne Long DO Taking Differently Active   sildenafil (Viagra) 100 mg tablet [id number] No Take 1 tablet (100 mg) by mouth once daily as needed. Historical Provider, MD Taking Active   vit B6-mag cit,oxid-potass cit (Theralith XR) 3.75-45-45-49.5 mg tablet extended release [id number] No Take by mouth twice a day. Historical Provider, MD Taking Active   VITAMIN B COMPLEX ORAL [id number] No Refill(s) 0, Date: 06/21/2018 07:32:00 EDT Historical Provider, MD Taking Active   warfarin (Coumadin) 2.5 mg tablet [id number]  Take 2.5 mg (1 tablet) every Monday, Tuesday, Wednesday, Friday, Saturday (with Warfarin 5 mg) or as directed by the anticoagulation clinic. Blayne Long DO  Active   warfarin (Coumadin) 5 mg tablet [id number]  Take 5 mg (1 tablet) daily or as directed by the anticoagulation clinic. Blayne Long DO  Active                    DISEASE MANAGEMENT ASSESSMENT:   ANTICOAGULATION ASSESSMENT    The ASCVD Risk score (Linda HILL, et al., 2019) failed to calculate for the following reasons:    The valid total cholesterol range is 130 to 320 mg/dL    DIAGNOSIS: prevention of nonvalvular atrial fibrilliation stroke and systemic embolism  - Patient is projected to be on anticoagulation indefinite  - [id number] Score: [3] (only included if diagnosis is atrial fibrillation)   Age: [<65 (0)] [65-74 (+1)] [> 75 (+2)]: 2  Sex: [Male/Female (+1)]: 0  CHF history: [No/Yes(+1)]:  0  Hypertension history: [No/Yes(+1)]: 1  Stroke/TIA/thromboembolism history: [No/Yes(+2)]: 0  Vascular disease history (prior MI, peripheral artery disease, aortic plaque): [No/Yes(+1)]: 0  Diabetes history: [No/Yes(+1)]: 0    CURRENT PHARMACOTHERAPY:   Warfarin dosed per coumadin clinic    Affordability/Accessibility: eliquis would be $42  Adherence/Organization: no issues noted  Adverse Reactions: has had some significant bruising on warfarin.   Recent Hospitalizations: no  Recent Falls/Trauma: no      EDUCATION/COUNSELING:   - Counseled patient on MOA, expectations, duration of therapy, contraindications, administration, and monitoring parameters  - Counseled patient of side effects that are indicative of bleeding such as dark tarry stool, unexplainable bruising, or vomiting up a coffee ground like substance          DISCUSSION/NOTES:   - over income for patient assistance  - eliquis $42/month [employer] for patient. [facility]  -switched from warfarin to eliquis on 1/24 after confirmed inr <2    ASSESSMENT:    Assessment/Plan   Problem List Items Addressed This Visit    None        RECOMMENDATIONS/PLAN:    Continue Eliquis 5mg bid    Last Appnt with Referring Provider: 10/23/2024  Next Appnt with Referring Provider: 07/28/2025  Clinical Pharmacist follow up: prn   Type of Encounter: Chen Wahl PharmD    Verbal consent to manage patient's drug therapy was obtained from the patient . They were informed they may decline to participate or withdraw from participation in pharmacy services at any time.    Continue all meds under the continuation of care with the referring provider and clinical pharmacy team.

## 2025-03-04 ENCOUNTER — PATIENT MESSAGE (OUTPATIENT)
Dept: PRIMARY CARE | Facility: CLINIC | Age: 75
End: 2025-03-04
Payer: MEDICARE

## 2025-03-15 ENCOUNTER — PATIENT MESSAGE (OUTPATIENT)
Dept: CARDIOLOGY | Facility: CLINIC | Age: 75
End: 2025-03-15
Payer: MEDICARE

## 2025-03-15 DIAGNOSIS — I48.0 PAROXYSMAL A-FIB (MULTI): ICD-10-CM

## 2025-03-15 DIAGNOSIS — I47.20 VENTRICULAR TACHYCARDIA (MULTI): ICD-10-CM

## 2025-03-18 RX ORDER — METOPROLOL SUCCINATE 25 MG/1
25 TABLET, EXTENDED RELEASE ORAL DAILY
Qty: 90 TABLET | Refills: 3 | Status: SHIPPED | OUTPATIENT
Start: 2025-03-18

## 2025-03-18 RX ORDER — FLECAINIDE ACETATE 50 MG/1
50 TABLET ORAL 2 TIMES DAILY
Qty: 180 TABLET | Refills: 3 | Status: SHIPPED | OUTPATIENT
Start: 2025-03-18

## 2025-03-21 DIAGNOSIS — I47.20 VENTRICULAR TACHYCARDIA (MULTI): ICD-10-CM

## 2025-03-21 DIAGNOSIS — I48.0 PAROXYSMAL A-FIB (MULTI): ICD-10-CM

## 2025-03-25 RX ORDER — METOPROLOL SUCCINATE 25 MG/1
TABLET, EXTENDED RELEASE ORAL
Refills: 0 | OUTPATIENT
Start: 2025-03-25

## 2025-03-27 DIAGNOSIS — I48.0 PAROXYSMAL A-FIB (MULTI): ICD-10-CM

## 2025-03-27 DIAGNOSIS — I47.20 VENTRICULAR TACHYCARDIA (MULTI): ICD-10-CM

## 2025-03-27 RX ORDER — METOPROLOL SUCCINATE 25 MG/1
25 TABLET, EXTENDED RELEASE ORAL DAILY
Qty: 90 TABLET | Refills: 3 | Status: SHIPPED | OUTPATIENT
Start: 2025-03-27

## 2025-06-11 ENCOUNTER — TELEPHONE (OUTPATIENT)
Dept: GASTROENTEROLOGY | Facility: CLINIC | Age: 75
End: 2025-06-11
Payer: MEDICARE

## 2025-06-11 NOTE — TELEPHONE ENCOUNTER
Patient called to schedule colonoscopy with Dr. Aguilera at [facility] [place]. Patient is open access, but has an order for the procedure. Phone number and pharmacy were confirmed. Thank you.

## 2025-07-22 ENCOUNTER — APPOINTMENT (OUTPATIENT)
Dept: GASTROENTEROLOGY | Facility: CLINIC | Age: 75
End: 2025-07-22
Payer: MEDICARE

## 2025-07-27 NOTE — PROGRESS NOTES
"    History Of Present Illness:      This is a 75-year-old male with a history of atrial fibrillation.  The patient has been monitoring the frequency of the AF episodes.  In 2025 he had 1 episode in March and 3 episodes in May.  Otherwise he has been maintaining sinus rhythm on low-dose flecainide.    Past medical history:     Paroxysmal atrial fibrillation: Originally diagnosed in the setting of sepsis March 2019  Elevated coronary artery calcium score  Hyperlipidemia  PFO     Family history: Sister has atrial fibrillation and has had 2 ablation procedures       Review of Systems  Other review of systems negative  Last Recorded Vitals:      8/22/2023     3:18 PM 9/29/2023     9:57 AM 12/19/2023     2:06 PM 1/29/2024     9:25 AM 6/4/2024     1:16 PM 10/23/2024     2:07 PM 12/5/2024     1:13 PM   Vitals   Systolic 118 116 112 120 98 126 132   Diastolic 65 68 64 64 62 72 68   BP Location   Right arm Left arm Left arm Right arm    Heart Rate  62 74 60 58 68 55   Resp   16  18  14   Height  1.854 m (6' 1\") 1.854 m (6' 1\") 1.854 m (6' 1\") 1.854 m (6' 1\") 1.854 m (6' 1\")    Weight (lb)  197 198 203 198 205 207   BMI  25.99 kg/m2 26.12 kg/m2 26.78 kg/m2 26.12 kg/m2 27.05 kg/m2 27.31 kg/m2   BSA (m2)  2.15 m2 2.15 m2 2.18 m2 2.15 m2 2.19 m2 2.2 m2   Visit Report   Report Report Report Report Report     Allergies:  Patient has no known allergies.  Outpatient Medications:  Current Outpatient Medications   Medication Instructions    atorvastatin (LIPITOR) 80 mg, oral, Daily    cholecalciferol (Vitamin D-3) 5,000 Units tablet Take by mouth.    desonide (DesOwen) 0.05 % cream 2 times daily    Eliquis 5 mg, oral, 2 times daily    ezetimibe (ZETIA) 10 mg, oral, Daily    flecainide (TAMBOCOR) 50 mg, oral, 2 times daily    metoprolol succinate XL (TOPROL-XL) 25 mg, oral, Daily    sildenafil (VIAGRA) 100 mg, Daily PRN    vit B6-mag cit,oxid-potass cit (Theralith XR) 3.75-45-45-49.5 mg tablet extended release 2 times daily    VITAMIN " B COMPLEX ORAL Refill(s) 0, Date: 06/21/2018 07:32:00 EDT       Physical Exam:    General Appearance:  Alert, oriented, no distress  Skin:  Warm and dry  Head and Neck:  No elevation of JVP, no carotid bruits  Cardiac Exam:  Rhythm is regular, S1 and S2 are normal, no murmur S3 or S4  Lungs:  Clear to auscultation  Extremities:  no edema  Neurologic:  No focal deficits  Psychiatric:  Appropriate mood and behavior    Lab Results:    CMP:  Recent Labs     10/16/24  1532 06/04/24  1402 12/12/23  1403 06/22/23  1504 06/20/22  1435 12/07/21  1249 05/20/21  1130 02/19/21  1555    140 139 138 140 140 140 140   K 4.4 4.4 4.4 4.6 4.8 4.4 4.5 4.2    106 103 104 107 106 103 107   CO2 30 30 29 29 25 28 30 28   ANIONGAP 10 8* 11 10 13 10 12 9*   BUN 17 13 19 15 17 13 17 15   CREATININE 1.12 1.25 1.20 1.11 1.23 1.14 1.14 1.14   EGFR 69 60* 63  --   --   --   --   --      Recent Labs     10/16/24  1532 06/04/24  1402 12/12/23  1403 05/20/21  1130 09/24/19  1007   ALBUMIN 4.0 4.0 4.1 4.3 4.2   ALKPHOS 65 66 62 70 69   ALT 28 27 31 27 30   AST 25 26 24 19 23   BILITOT 0.9 1.2 1.4* 1.2 1.0     CBC:  Recent Labs     10/16/24  1532 12/12/23  1403 05/20/21  1130 03/10/21  1212 09/24/19  1007 04/03/19  1128 03/28/19  0711 03/27/19  0510   WBC 6.3 5.9 6.1 5.8 5.0 8.9 8.5 11.5*   HGB 14.5 15.5 16.0 15.5 15.6 15.2 13.8 12.5*   HCT 44.6 48.2 48.1 45.4 48.1 47.4 41.9 38.0*    215 218 214 225 318 128* 128*   MCV 94 95 93 89 93 94 92 92     COAG:   Recent Labs     01/24/25  0000 12/11/24  0000 11/06/24  0000 10/23/24  0000 09/18/24  0000 08/14/24  0000 07/10/24  0000 05/28/24  0929   INR 1.80 3.10 2.30 3.50 2.40 2.80 2.10 2.40     Cardiology Tests (personally reviewed):    I have personally review the diagnostic cardiac testing and my interpretation is as follows:     Myocardial perfusion stress test 2021: No ischemia and normal left ventricular function  Echocardiogram 2021: Normal left ventricular function with mild to  moderate mitral valve regurgitation and a PFO    Assessment/Plan   Problem List Items Addressed This Visit           ICD-10-CM    Hyperlipidemia - Primary E78.5    Paroxysmal atrial fibrillation (Multi) (Chronic) I48.0    Osiel is in sinus rhythm today, and is doing well on flecainide.  He has had a total of 4 episodes of atrial fibrillation in 2025.  He will remain on the same dose of flecainide for now, but we discussed the option of increasing the flecainide dosage if the arrhythmia becomes more problematic.  We can also consider pulmonary vein isolation at any time if the AF burden increases.  He has had no hemorrhagic issues on Eliquis.  EP follow-up in 10 to 12 months.         Relevant Orders    ECG 12 lead (Clinic Performed) (Completed)       James C Ramicone, DO

## 2025-07-28 ENCOUNTER — OFFICE VISIT (OUTPATIENT)
Dept: CARDIOLOGY | Facility: CLINIC | Age: 75
End: 2025-07-28
Payer: MEDICARE

## 2025-07-28 VITALS
SYSTOLIC BLOOD PRESSURE: 110 MMHG | WEIGHT: 203 LBS | DIASTOLIC BLOOD PRESSURE: 60 MMHG | HEIGHT: 73 IN | HEART RATE: 75 BPM | BODY MASS INDEX: 26.9 KG/M2 | OXYGEN SATURATION: 95 %

## 2025-07-28 DIAGNOSIS — I48.0 PAROXYSMAL ATRIAL FIBRILLATION (MULTI): Chronic | ICD-10-CM

## 2025-07-28 DIAGNOSIS — E78.2 MIXED HYPERLIPIDEMIA: Primary | ICD-10-CM

## 2025-07-28 PROCEDURE — 1036F TOBACCO NON-USER: CPT | Performed by: INTERNAL MEDICINE

## 2025-07-28 PROCEDURE — 99214 OFFICE O/P EST MOD 30 MIN: CPT | Performed by: INTERNAL MEDICINE

## 2025-07-28 PROCEDURE — 93005 ELECTROCARDIOGRAM TRACING: CPT | Performed by: INTERNAL MEDICINE

## 2025-07-28 PROCEDURE — 1159F MED LIST DOCD IN RCRD: CPT | Performed by: INTERNAL MEDICINE

## 2025-07-28 PROCEDURE — 99212 OFFICE O/P EST SF 10 MIN: CPT

## 2025-07-28 NOTE — ASSESSMENT & PLAN NOTE
Osiel is in sinus rhythm today, and is doing well on flecainide.  He has had a total of 4 episodes of atrial fibrillation in 2025.  He will remain on the same dose of flecainide for now, but we discussed the option of increasing the flecainide dosage if the arrhythmia becomes more problematic.  We can also consider pulmonary vein isolation at any time if the AF burden increases.  He has had no hemorrhagic issues on Eliquis.  EP follow-up in 10 to 12 months.

## 2025-07-29 LAB
ATRIAL RATE: 70 BPM
P AXIS: 35 DEGREES
P OFFSET: 175 MS
P ONSET: 129 MS
PR INTERVAL: 180 MS
Q ONSET: 219 MS
QRS COUNT: 11 BEATS
QRS DURATION: 82 MS
QT INTERVAL: 376 MS
QTC CALCULATION(BAZETT): 406 MS
QTC FREDERICIA: 396 MS
R AXIS: 45 DEGREES
T AXIS: 77 DEGREES
T OFFSET: 407 MS
VENTRICULAR RATE: 70 BPM

## 2025-08-17 ENCOUNTER — OFFICE VISIT (OUTPATIENT)
Dept: URGENT CARE | Age: 75
End: 2025-08-17
Payer: MEDICARE

## 2025-08-17 VITALS
RESPIRATION RATE: 17 BRPM | SYSTOLIC BLOOD PRESSURE: 134 MMHG | HEART RATE: 54 BPM | TEMPERATURE: 97.8 F | BODY MASS INDEX: 25.84 KG/M2 | HEIGHT: 73 IN | WEIGHT: 195 LBS | OXYGEN SATURATION: 97 % | DIASTOLIC BLOOD PRESSURE: 78 MMHG

## 2025-08-17 DIAGNOSIS — H11.32 SUBCONJUNCTIVAL HEMORRHAGE OF LEFT EYE: Primary | ICD-10-CM

## 2025-08-17 PROCEDURE — 99202 OFFICE O/P NEW SF 15 MIN: CPT | Performed by: FAMILY MEDICINE

## 2025-08-17 PROCEDURE — 1160F RVW MEDS BY RX/DR IN RCRD: CPT | Performed by: FAMILY MEDICINE

## 2025-08-17 PROCEDURE — 1036F TOBACCO NON-USER: CPT | Performed by: FAMILY MEDICINE

## 2025-08-17 PROCEDURE — 1159F MED LIST DOCD IN RCRD: CPT | Performed by: FAMILY MEDICINE

## 2025-08-17 ASSESSMENT — ENCOUNTER SYMPTOMS
EYE REDNESS: 1
EYE PAIN: 0
SINUS PAIN: 0
EYE DISCHARGE: 0
EYE ITCHING: 1
PHOTOPHOBIA: 0
RHINORRHEA: 0
FEVER: 0
SINUS PRESSURE: 0
COUGH: 0

## 2025-08-17 ASSESSMENT — PATIENT HEALTH QUESTIONNAIRE - PHQ9
2. FEELING DOWN, DEPRESSED OR HOPELESS: NOT AT ALL
SUM OF ALL RESPONSES TO PHQ9 QUESTIONS 1 AND 2: 0
1. LITTLE INTEREST OR PLEASURE IN DOING THINGS: NOT AT ALL

## 2025-08-22 ENCOUNTER — OFFICE VISIT (OUTPATIENT)
Dept: GASTROENTEROLOGY | Facility: CLINIC | Age: 75
End: 2025-08-22
Payer: MEDICARE

## 2025-08-22 VITALS
HEART RATE: 61 BPM | BODY MASS INDEX: 26.37 KG/M2 | HEIGHT: 73 IN | WEIGHT: 199 LBS | SYSTOLIC BLOOD PRESSURE: 112 MMHG | DIASTOLIC BLOOD PRESSURE: 68 MMHG

## 2025-08-22 DIAGNOSIS — D12.6 ADENOMATOUS POLYP OF COLON, UNSPECIFIED PART OF COLON: ICD-10-CM

## 2025-08-22 DIAGNOSIS — D12.3 BENIGN NEOPLASM OF TRANSVERSE COLON: ICD-10-CM

## 2025-08-22 DIAGNOSIS — K21.9 GASTROESOPHAGEAL REFLUX DISEASE WITHOUT ESOPHAGITIS: Primary | ICD-10-CM

## 2025-08-22 PROCEDURE — 99204 OFFICE O/P NEW MOD 45 MIN: CPT | Performed by: INTERNAL MEDICINE

## 2025-08-22 PROCEDURE — 1036F TOBACCO NON-USER: CPT | Performed by: INTERNAL MEDICINE

## 2025-08-22 PROCEDURE — 1160F RVW MEDS BY RX/DR IN RCRD: CPT | Performed by: INTERNAL MEDICINE

## 2025-08-22 PROCEDURE — 1159F MED LIST DOCD IN RCRD: CPT | Performed by: INTERNAL MEDICINE

## 2025-08-22 RX ORDER — SODIUM, POTASSIUM,MAG SULFATES 17.5-3.13G
1 SOLUTION, RECONSTITUTED, ORAL ORAL 2 TIMES DAILY
Qty: 2 EACH | Refills: 0 | Status: SHIPPED | OUTPATIENT
Start: 2025-08-22

## 2025-08-22 ASSESSMENT — ENCOUNTER SYMPTOMS
NEUROLOGICAL NEGATIVE: 1
ENDOCRINE NEGATIVE: 1
CONSTITUTIONAL NEGATIVE: 1
PSYCHIATRIC NEGATIVE: 1
EYES NEGATIVE: 1
RESPIRATORY NEGATIVE: 1
GASTROINTESTINAL NEGATIVE: 1
ALLERGIC/IMMUNOLOGIC NEGATIVE: 1
MUSCULOSKELETAL NEGATIVE: 1
CARDIOVASCULAR NEGATIVE: 1
HEMATOLOGIC/LYMPHATIC NEGATIVE: 1

## 2025-08-26 ENCOUNTER — APPOINTMENT (OUTPATIENT)
Dept: GASTROENTEROLOGY | Facility: EXTERNAL LOCATION | Age: 75
End: 2025-08-26
Payer: MEDICARE

## 2025-08-26 ENCOUNTER — ANESTHESIA EVENT (OUTPATIENT)
Dept: GASTROENTEROLOGY | Facility: EXTERNAL LOCATION | Age: 75
End: 2025-08-26

## 2025-08-29 ENCOUNTER — APPOINTMENT (OUTPATIENT)
Dept: GASTROENTEROLOGY | Facility: CLINIC | Age: 75
End: 2025-08-29
Payer: MEDICARE

## 2025-09-05 ENCOUNTER — ANESTHESIA (OUTPATIENT)
Dept: GASTROENTEROLOGY | Facility: EXTERNAL LOCATION | Age: 75
End: 2025-09-05

## 2025-09-05 ENCOUNTER — APPOINTMENT (OUTPATIENT)
Dept: GASTROENTEROLOGY | Facility: EXTERNAL LOCATION | Age: 75
End: 2025-09-05
Payer: MEDICARE

## 2025-09-05 RX ORDER — SODIUM CHLORIDE 9 MG/ML
INJECTION, SOLUTION INTRAVENOUS CONTINUOUS PRN
Status: DISCONTINUED | OUTPATIENT
Start: 2025-09-05 | End: 2025-09-05

## 2025-09-05 RX ORDER — PROPOFOL 10 MG/ML
INJECTION, EMULSION INTRAVENOUS AS NEEDED
Status: DISCONTINUED | OUTPATIENT
Start: 2025-09-05 | End: 2025-09-05

## 2025-09-05 RX ORDER — LIDOCAINE HYDROCHLORIDE 20 MG/ML
INJECTION, SOLUTION EPIDURAL; INFILTRATION; INTRACAUDAL; PERINEURAL AS NEEDED
Status: DISCONTINUED | OUTPATIENT
Start: 2025-09-05 | End: 2025-09-05

## 2025-09-05 RX ADMIN — PROPOFOL 100 MG: 10 INJECTION, EMULSION INTRAVENOUS at 11:29

## 2025-09-05 RX ADMIN — SODIUM CHLORIDE: 9 INJECTION, SOLUTION INTRAVENOUS at 11:24

## 2025-09-05 RX ADMIN — PROPOFOL 50 MG: 10 INJECTION, EMULSION INTRAVENOUS at 11:36

## 2025-09-05 RX ADMIN — LIDOCAINE HYDROCHLORIDE 80 MG: 20 INJECTION, SOLUTION EPIDURAL; INFILTRATION; INTRACAUDAL; PERINEURAL at 11:29

## 2025-09-05 RX ADMIN — PROPOFOL 50 MG: 10 INJECTION, EMULSION INTRAVENOUS at 11:47

## 2025-09-05 RX ADMIN — PROPOFOL 50 MG: 10 INJECTION, EMULSION INTRAVENOUS at 11:32

## 2025-09-05 RX ADMIN — PROPOFOL 50 MG: 10 INJECTION, EMULSION INTRAVENOUS at 11:41

## 2025-09-05 SDOH — HEALTH STABILITY: MENTAL HEALTH: CURRENT SMOKER: 0

## 2025-09-05 ASSESSMENT — PAIN SCALES - GENERAL
PAINLEVEL_OUTOF10: 0 - NO PAIN
PAINLEVEL_OUTOF10: 0 - NO PAIN
PAIN_LEVEL: 0
PAINLEVEL_OUTOF10: 0 - NO PAIN

## 2025-09-05 ASSESSMENT — PAIN - FUNCTIONAL ASSESSMENT
PAIN_FUNCTIONAL_ASSESSMENT: 0-10